# Patient Record
Sex: FEMALE | Race: WHITE | NOT HISPANIC OR LATINO | Employment: OTHER | URBAN - METROPOLITAN AREA
[De-identification: names, ages, dates, MRNs, and addresses within clinical notes are randomized per-mention and may not be internally consistent; named-entity substitution may affect disease eponyms.]

---

## 2024-04-02 ENCOUNTER — TELEPHONE (OUTPATIENT)
Dept: GASTROENTEROLOGY | Facility: CLINIC | Age: 54
End: 2024-04-02
Payer: MEDICARE

## 2024-04-02 NOTE — TELEPHONE ENCOUNTER
----- Message from Zulema Cassidy sent at 4/2/2024 10:38 AM CDT -----  Regarding: Referral  Good morning,       I received a referral on behalf of the patient attached, from MERVIN Kapoor requesting evaluation on a pt who has had a rich-en-y gastric bypass with cholelithiasis and evidence for CBD stone.  I have scanned the referral and notes into media mgr.  Please review and contact the patient to schedule.      Thank you,        Zulema Cassidy  [facility]

## 2024-04-05 ENCOUNTER — TELEPHONE (OUTPATIENT)
Dept: SURGERY | Facility: CLINIC | Age: 54
End: 2024-04-05
Payer: MEDICARE

## 2024-04-08 ENCOUNTER — TELEPHONE (OUTPATIENT)
Dept: SURGERY | Facility: CLINIC | Age: 54
End: 2024-04-08
Payer: MEDICARE

## 2024-04-09 ENCOUNTER — TELEPHONE (OUTPATIENT)
Dept: GASTROENTEROLOGY | Facility: CLINIC | Age: 54
End: 2024-04-09
Payer: MEDICARE

## 2024-04-09 NOTE — TELEPHONE ENCOUNTER
----- Message from Anibal Rose sent at 4/9/2024 10:29 AM CDT -----  Regarding: appt  change  PATIENT CALL    Pt called regarding upcoming appt. She was under the impression that this would be a virtual appt. Verified that she has access to the portal, Please call back at [phone] to advise further

## 2024-04-10 NOTE — PROGRESS NOTES
The patient location is: home  The chief complaint leading to consultation is: choledocholithiasis    Visit type: audiovisual    Face to Face time with patient: 36 minutes  45 minutes of total time spent on the encounter, which includes face to face time and non-face to face time preparing to see the patient (eg, review of tests), Obtaining and/or reviewing separately obtained history, Documenting clinical information in the electronic or other health record, Independently interpreting results (not separately reported) and communicating results to the patient/family/caregiver, or Care coordination (not separately reported).     Each patient to whom he or she provides medical services by telemedicine is:  (1) informed of the relationship between the physician and patient and the respective role of any other health care provider with respect to management of the patient; and (2) notified that he or she may decline to receive medical services by telemedicine and may withdraw from such care at any time.    Encounter Date:  2024    Patient ID: Anila Roberson  Age:  54 y.o. :  1970       Chief Complaint   Patient presents with    Consult    Cholelithiasis     History:    Ms. Roberson is a 54 y.o. female who presents with choledocholithiasis. She remains at home in [place], [place].     Referred by: Dr. KIANA Kapoor    Reviewed outside medical records and imaging.   She presented to local ED with epigastric / chest pain that radiated to her back in 2024. Imaging identified distended gallbladder with stones and stones in common bile duct and LFTs were elevated. Labs last month revealed LFTs WNL. She continues to experience intermittent epigastric pain and usually has post prandial discomfort. She has changed to a bland diet, oral diet recall includes eggs/ toast, yogurt, salad, ground turkey. Drinks mostly water. Reports she is maintaining weight, home scale weight is 174-176#. Denies N/V/D. Denies dysphagia.  Remains afebile since completing oral abx provided at [facility].   Followed by pain mgmt for chronic pain in back and neck as well as migraines and fibromyalgia.   Followed by cardiology at [facility]  Pending surgery for KEITH.     Prior abd surgery: Gastric bypass 2002  Not currently taking anticoagulation  Former smoked    Data:     Radiology:  Dr. Underwood and I personally reviewed these images:   3/19/2024: MRI abd:      2/2024: CT A/P:        3/6/2024 Labs:  [facility]              Endoscopy:        Past Medical History:   Diagnosis Date    Arthritis     Atrial fibrillation     GERD (gastroesophageal reflux disease)     Hyperlipidemia     Hypertension     Hypothyroidism     Migraine headache     Sleep apnea, unspecified      Past Surgical History:   Procedure Laterality Date    CARDIAC ELECTROPHYSIOLOGY STUDY AND ABLATION  2022    CERVICAL FUSION      cage C4-C7    GASTRIC BYPASS  2002    SPINE SURGERY      L5-S1     Current Outpatient Medications on File Prior to Visit   Medication Sig Dispense Refill    amLODIPine (NORVASC) 10 MG tablet Take 10 mg by mouth once daily.      atorvastatin (LIPITOR) 80 MG tablet Take 40 mg by mouth every evening.      butalbital-acetaminophen-caffeine -40 mg (FIORICET, ESGIC) -40 mg per tablet Take 1 tablet by mouth 2 (two) times daily as needed for Headaches.      estradioL (ESTRACE) 1 MG tablet Take 1 tablet by mouth every evening.      famotidine (PEPCID) 20 MG tablet Take 20 mg by mouth 2 (two) times daily.      FLUoxetine 20 MG capsule Take 80 mg by mouth once daily.      HYDROcodone-acetaminophen (NORCO)  mg per tablet Take 1 tablet by mouth every 6 (six) hours as needed for Pain.      levothyroxine (SYNTHROID) 50 MCG tablet Take 50 mcg by mouth before breakfast.      magnesium oxide 420 mg Tab Take 420 mg by mouth once daily.      MYRBETRIQ 50 mg Tb24 Take 1 tablet by mouth every morning.      propranoloL (INDERAL) 10 MG tablet Take 30 mg by  mouth 2 (two) times daily.      tiZANidine (ZANAFLEX) 2 MG tablet Take 2 mg by mouth every 8 (eight) hours as needed.      tolterodine (DETROL LA) 4 MG 24 hr capsule Take 4 mg by mouth every evening.      topiramate (TOPAMAX) 100 MG tablet Take 100 mg by mouth 2 (two) times daily.      zinc sulfate (ZINCATE) 50 mg zinc (220 mg) capsule Take 50 mg by mouth once daily.      BANOPHEN 25 mg capsule Take 25 mg by mouth nightly as needed. (Patient not taking: Reported on 4/11/2024)      promethazine (PHENERGAN) 25 MG tablet Take 1 tablet by mouth every 6 (six) hours as needed for Nausea. (Patient not taking: Reported on 4/11/2024)       No current facility-administered medications on file prior to visit.     Review of patient's allergies indicates:  No Known Allergies    Family History:  Her family history includes Alcohol abuse in her mother; Heart disease in her father and mother.     Social History:   reports that she has quit smoking. Her smoking use included cigarettes. She does not have any smokeless tobacco history on file. She reports current alcohol use of about 1.0 standard drink of alcohol per week. She reports current drug use. Drug: Marijuana.   States she has a license for medical marijuana    ROS:    Pertinent positive/negatives detailed in HPI, all other systems negative.     Physical Exam: virtual visit only  Wt 78.9 kg (174 lb)     Constitutional:  Non-toxic, no acute distress.    Eyes:  Sclerae anicteric, gaze symmetrical  Neck:  Trachea midline, FROM  Resp:  Even and unlabored resp  Neuro:  No gross facial deficits  Psych:  Awake, alert, oriented.  Answers and asks questions appropriately      ICD-10-CM ICD-9-CM    1. Choledocholithiasis  K80.50 574.50 CBC Auto Differential      HEPATIC FUNCTION PANEL      Prealbumin      Comprehensive Metabolic Panel      PROTIME-INR      2. H/O gastric bypass in 2002  Z98.84 V45.86       3. Abnormal results of liver function studies  R94.5 794.8 PROTIME-INR         Plan:  Discussed laparoscopic cholecystectomy with intraoperative ERCP if needed. Will coordinate with [facility].   Needs an in person visit with labs including LFTs prior surgery. She verbalized understanding and is agreeable to this plan.     Discussed case with Dr. Underwood who agrees with the above plan of care.         Madelyn Gutierrez NP  Upper GI / Hepatobiliary Surgical Oncology  [facility] [place], [place]  Office: [phone]  Fax: [phone]

## 2024-04-11 ENCOUNTER — OFFICE VISIT (OUTPATIENT)
Dept: SURGERY | Facility: CLINIC | Age: 54
End: 2024-04-11
Payer: MEDICARE

## 2024-04-11 ENCOUNTER — OFFICE VISIT (OUTPATIENT)
Dept: GASTROENTEROLOGY | Facility: CLINIC | Age: 54
End: 2024-04-11
Payer: MEDICARE

## 2024-04-11 VITALS — WEIGHT: 174 LBS

## 2024-04-11 DIAGNOSIS — R93.3 ABNORMAL MAGNETIC RESONANCE CHOLANGIOPANCREATOGRAPHY (MRCP): Primary | ICD-10-CM

## 2024-04-11 DIAGNOSIS — R94.5 ABNORMAL RESULTS OF LIVER FUNCTION STUDIES: ICD-10-CM

## 2024-04-11 DIAGNOSIS — K80.50 CHOLEDOCHOLITHIASIS: Primary | ICD-10-CM

## 2024-04-11 DIAGNOSIS — Z98.84 H/O GASTRIC BYPASS: ICD-10-CM

## 2024-04-11 DIAGNOSIS — R10.10 PAIN OF UPPER ABDOMEN: ICD-10-CM

## 2024-04-11 PROCEDURE — 99204 OFFICE O/P NEW MOD 45 MIN: CPT | Mod: 95,,, | Performed by: NURSE PRACTITIONER

## 2024-04-11 PROCEDURE — 99203 OFFICE O/P NEW LOW 30 MIN: CPT | Mod: 95,,, | Performed by: INTERNAL MEDICINE

## 2024-04-11 RX ORDER — MIRABEGRON 50 MG/1
1 TABLET, FILM COATED, EXTENDED RELEASE ORAL EVERY MORNING
COMMUNITY
Start: 2023-06-10 | End: 2024-09-25

## 2024-04-11 RX ORDER — TOLTERODINE 4 MG/1
4 CAPSULE, EXTENDED RELEASE ORAL NIGHTLY
COMMUNITY
Start: 2023-05-10

## 2024-04-11 RX ORDER — FLUOXETINE HYDROCHLORIDE 20 MG/1
80 CAPSULE ORAL DAILY
COMMUNITY
Start: 2023-07-10

## 2024-04-11 RX ORDER — DIPHENHYDRAMINE HYDROCHLORIDE 25 MG/1
25 CAPSULE ORAL NIGHTLY PRN
COMMUNITY

## 2024-04-11 RX ORDER — ESTRADIOL 1 MG/1
1 TABLET ORAL NIGHTLY
COMMUNITY
Start: 2023-07-10 | End: 2025-01-08

## 2024-04-11 RX ORDER — ATORVASTATIN CALCIUM 80 MG/1
40 TABLET, FILM COATED ORAL NIGHTLY
COMMUNITY
Start: 2024-01-08

## 2024-04-11 RX ORDER — TOPIRAMATE 100 MG/1
100 TABLET, FILM COATED ORAL 2 TIMES DAILY
COMMUNITY

## 2024-04-11 RX ORDER — PROMETHAZINE HYDROCHLORIDE 25 MG/1
1 TABLET ORAL EVERY 6 HOURS PRN
COMMUNITY
Start: 2023-08-04 | End: 2024-10-04

## 2024-04-11 RX ORDER — HYDROCODONE BITARTRATE AND ACETAMINOPHEN 10; 325 MG/1; MG/1
1 TABLET ORAL EVERY 6 HOURS PRN
COMMUNITY
Start: 2023-06-10 | End: 2024-04-22

## 2024-04-11 RX ORDER — LEVOTHYROXINE SODIUM 50 UG/1
50 TABLET ORAL
COMMUNITY
Start: 2024-01-08

## 2024-04-11 RX ORDER — PROPRANOLOL HYDROCHLORIDE 10 MG/1
30 TABLET ORAL 2 TIMES DAILY
COMMUNITY
Start: 2023-06-10 | End: 2025-01-08

## 2024-04-11 RX ORDER — BUTALBITAL, ACETAMINOPHEN AND CAFFEINE 50; 325; 40 MG/1; MG/1; MG/1
1 TABLET ORAL 2 TIMES DAILY PRN
COMMUNITY

## 2024-04-11 RX ORDER — TIZANIDINE 2 MG/1
2 TABLET ORAL EVERY 8 HOURS PRN
COMMUNITY
Start: 2023-08-24 | End: 2025-01-08

## 2024-04-11 RX ORDER — AMLODIPINE BESYLATE 10 MG/1
10 TABLET ORAL DAILY
COMMUNITY
Start: 2024-01-08 | End: 2025-01-08

## 2024-04-11 RX ORDER — FAMOTIDINE 20 MG/1
20 TABLET, FILM COATED ORAL 2 TIMES DAILY
Status: ON HOLD | COMMUNITY
Start: 2023-07-10 | End: 2024-04-26 | Stop reason: HOSPADM

## 2024-04-11 RX ORDER — MAGNESIUM OXIDE 420 MG/1
420 TABLET ORAL DAILY
COMMUNITY
Start: 2024-01-08 | End: 2025-01-08

## 2024-04-11 RX ORDER — ZINC SULFATE 50(220)MG
50 CAPSULE ORAL DAILY
COMMUNITY
Start: 2023-08-10

## 2024-04-11 NOTE — PROGRESS NOTES
The patient location is:  Home in [place]  The chief complaint leading to consultation is: abnl MRCP; possible bile duct obstruction    Visit type: audiovisual    Face to Face time with patient: 15  25 minutes of total time spent on the encounter, which includes face to face time and non-face to face time preparing to see the patient (eg, review of tests), Obtaining and/or reviewing separately obtained history, Documenting clinical information in the electronic or other health record, Independently interpreting results (not separately reported) and communicating results to the patient/family/caregiver, or Care coordination (not separately reported).         Each patient to whom he or she provides medical services by telemedicine is:  (1) informed of the relationship between the physician and patient and the respective role of any other health care provider with respect to management of the patient; and (2) notified that he or she may decline to receive medical services by telemedicine and may withdraw from such care at any time.    Notes:        Advanced Endoscopy / Pancreaticobiliary Service    Reason for visit (Chief Complaint):  Abnormal MRCP; epigastric pain    Referring provider/PCP: No referring provider defined for this encounter.    History of Present Illness: Patient presents for above.  She was referred here from [facility] by Dr. Nabil Kapoor.  She has had episodic epigastric and right upper quadrant pain since mid February.  The episode in mid February was quite severe and necessitated medical evaluation.  An MRI performed in [place] in March showed a dilated common bile duct with a possible filling defect in the distal common bile duct.  The gallbladder also had multiple stones.  She states her symptoms of abdominal pain do worsen with certain foods.     She has history of Que-en-Y gastric bypass performed about 20 years ago.      Physical Exam:  General: Well-developed,  well-appearing, no acute distress      Laboratory:   Reviewed labs from Feb.  There is mild elevation of her transaminases and alkaline phosphatase.    Imaging:  Reviewed reports of her MRCP.    Assessment/Plan:  Abnormal MRCP and epigastric pain- her symptoms are certainly consistent with biliary colic.  She will be seeing 1 of my surgical colleagues later today.  I suspect a gallbladder resection will be planned.  Given her history of Que-en-Y gastric bypass, it would probably be best to coordinate her gallbladder resection with a possible intraoperative ERCP.  Ideally, the surgical team would perform an intraoperative cholangiogram to confirm the presence or absence of a bile duct stone.  If a stone is seen, an intraoperative ERCP could be performed immediately.  I will await her evaluation by my surgical colleague, and we can then coordinate a date and time if cholecystectomy is planned.        Yariel Tovar MD, SUSIE   - Department of Gastroenterology  [facility] [place] [facility]

## 2024-04-12 ENCOUNTER — TELEPHONE (OUTPATIENT)
Dept: SURGERY | Facility: CLINIC | Age: 54
End: 2024-04-12
Payer: MEDICARE

## 2024-04-12 ENCOUNTER — PATIENT MESSAGE (OUTPATIENT)
Dept: SURGERY | Facility: CLINIC | Age: 54
End: 2024-04-12
Payer: MEDICARE

## 2024-04-12 NOTE — TELEPHONE ENCOUNTER
"Pt reports eating eggs this morning and experiencing a "gall bladder attack" that was longer and more painful than usual. Pt rated pain to be 9/10 using pain scale. Pt states she couldn't sit during attack and had to stand and bend over to relieve pain. Pt denies fever, body aches or chills at this time. Advised to go to nearest ED for assessment of pain worsens or if she experiences signs of infection as in fever, chills or body aches. Pt verbalized understanding and message sent to CHERRY Wise to notify.   "

## 2024-04-16 ENCOUNTER — PATIENT MESSAGE (OUTPATIENT)
Dept: HEMATOLOGY/ONCOLOGY | Facility: CLINIC | Age: 54
End: 2024-04-16
Payer: MEDICARE

## 2024-04-17 NOTE — PROGRESS NOTES
Encounter Date:  2024    Patient ID: Anila Roberson  Age:  54 y.o. :  1970    Chief Complaint:  abd pain     2024: presented to [place] ED with epigastric pain, imaging - distended gallbladder with stones in CBD.   3/19/2024: outside MRI    Interval History:  Ms. Roberson presents to clinic alone from [place], [place]. She was recently seen on VV to discuss possible lap cholecystectomy with intra-op ERCP if needed.   She is experiencing epigastric abd pain, radiates to lower back, worse since last week. She is tolerating bland diet. Home scale weight is stable at 176#. Reports mild nausea intermittently relieved with Zofran. Denies vomit and diarrhea. Taking Pepcid. Reports she had low grade fever earlier this week, resolved with Tylenol. Completed oral abx that were prescribed per [facility]. Labs last month with normal LFTs. Labs today - LFT elevated.   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   Followed by pain mgmt for chronic pain in back and neck as well as migraines and fibromyalgia.   Followed by cardiology at [facility]  Pending surgery for KEITH.      Prior abd surgery: Gastric bypass   Not currently taking anticoagulation  Former smoked    New Data:  Labs:    Lab Results   Component Value Date    WBC 10.51 2024    HGB 13.6 2024    HCT 42.1 2024    MCV 99 (H) 2024     2024     Sodium   Date Value Ref Range Status   2024 134 (L) 136 - 145 mmol/L Final     Potassium   Date Value Ref Range Status   2024 3.7 3.5 - 5.1 mmol/L Final     Chloride   Date Value Ref Range Status   2024 106 95 - 110 mmol/L Final     CO2   Date Value Ref Range Status   2024 20 (L) 23 - 29 mmol/L Final     Glucose   Date Value Ref Range Status   2024 113 (H) 70 - 110 mg/dL Final     BUN   Date Value Ref Range Status   2024 10 6 - 20 mg/dL Final     Creatinine   Date Value Ref Range  Status   04/18/2024 0.7 0.5 - 1.4 mg/dL Final     Calcium   Date Value Ref Range Status   04/18/2024 9.5 8.7 - 10.5 mg/dL Final     Total Protein   Date Value Ref Range Status   04/18/2024 7.4 6.0 - 8.4 g/dL Final   04/18/2024 7.4 6.0 - 8.4 g/dL Final     Albumin   Date Value Ref Range Status   04/18/2024 3.4 (L) 3.5 - 5.2 g/dL Final   04/18/2024 3.4 (L) 3.5 - 5.2 g/dL Final     Total Bilirubin   Date Value Ref Range Status   04/18/2024 0.7 0.1 - 1.0 mg/dL Final     Comment:     For infants and newborns, interpretation of results should be based  on gestational age, weight and in agreement with clinical  observations.    Premature Infant recommended reference ranges:  Up to 24 hours.............<8.0 mg/dL  Up to 48 hours............<12.0 mg/dL  3-5 days..................<15.0 mg/dL  6-29 days.................<15.0 mg/dL     04/18/2024 0.7 0.1 - 1.0 mg/dL Final     Comment:     For infants and newborns, interpretation of results should be based  on gestational age, weight and in agreement with clinical  observations.    Premature Infant recommended reference ranges:  Up to 24 hours.............<8.0 mg/dL  Up to 48 hours............<12.0 mg/dL  3-5 days..................<15.0 mg/dL  6-29 days.................<15.0 mg/dL       Alkaline Phosphatase   Date Value Ref Range Status   04/18/2024 282 (H) 55 - 135 U/L Final   04/18/2024 282 (H) 55 - 135 U/L Final     AST   Date Value Ref Range Status   04/18/2024 82 (H) 10 - 40 U/L Final   04/18/2024 82 (H) 10 - 40 U/L Final     ALT   Date Value Ref Range Status   04/18/2024 138 (H) 10 - 44 U/L Final   04/18/2024 138 (H) 10 - 44 U/L Final     Anion Gap   Date Value Ref Range Status   04/18/2024 8 8 - 16 mmol/L Final     eGFR   Date Value Ref Range Status   04/18/2024 >60.0 >60 mL/min/1.73 m^2 Final       Past Medical History:   Diagnosis Date    Arthritis     Atrial fibrillation     GERD (gastroesophageal reflux disease)     Hyperlipidemia     Hypertension     Hypothyroidism      Migraine headache     Sleep apnea, unspecified      Past Surgical History:   Procedure Laterality Date    CARDIAC ELECTROPHYSIOLOGY STUDY AND ABLATION  2022    CERVICAL FUSION      cage C4-C7    GASTRIC BYPASS  2002    SPINE SURGERY      L5-S1     Current Outpatient Medications on File Prior to Visit   Medication Sig Dispense Refill    amLODIPine (NORVASC) 10 MG tablet Take 10 mg by mouth once daily.      atorvastatin (LIPITOR) 80 MG tablet Take 40 mg by mouth every evening.      BANOPHEN 25 mg capsule Take 25 mg by mouth nightly as needed. (Patient not taking: Reported on 4/11/2024)      butalbital-acetaminophen-caffeine -40 mg (FIORICET, ESGIC) -40 mg per tablet Take 1 tablet by mouth 2 (two) times daily as needed for Headaches.      estradioL (ESTRACE) 1 MG tablet Take 1 tablet by mouth every evening.      famotidine (PEPCID) 20 MG tablet Take 20 mg by mouth 2 (two) times daily.      FLUoxetine 20 MG capsule Take 80 mg by mouth once daily.      HYDROcodone-acetaminophen (NORCO)  mg per tablet Take 1 tablet by mouth every 6 (six) hours as needed for Pain.      levothyroxine (SYNTHROID) 50 MCG tablet Take 50 mcg by mouth before breakfast.      magnesium oxide 420 mg Tab Take 420 mg by mouth once daily.      MYRBETRIQ 50 mg Tb24 Take 1 tablet by mouth every morning.      promethazine (PHENERGAN) 25 MG tablet Take 1 tablet by mouth every 6 (six) hours as needed for Nausea. (Patient not taking: Reported on 4/11/2024)      propranoloL (INDERAL) 10 MG tablet Take 30 mg by mouth 2 (two) times daily.      tiZANidine (ZANAFLEX) 2 MG tablet Take 2 mg by mouth every 8 (eight) hours as needed.      tolterodine (DETROL LA) 4 MG 24 hr capsule Take 4 mg by mouth every evening.      topiramate (TOPAMAX) 100 MG tablet Take 100 mg by mouth 2 (two) times daily.      zinc sulfate (ZINCATE) 50 mg zinc (220 mg) capsule Take 50 mg by mouth once daily.       No current facility-administered medications on file prior  to visit.     Review of patient's allergies indicates:  No Known Allergies    Family History:  Her family history includes Alcohol abuse in her mother; Heart disease in her father and mother.     Social History:   reports that she has quit smoking. Her smoking use included cigarettes. She does not have any smokeless tobacco history on file. She reports current alcohol use of about 1.0 standard drink of alcohol per week. She reports current drug use. Drug: Marijuana.     ROS:    Pertinent positive/negatives detailed in HPI, all other systems negative.     Physical Exam:  /71 (BP Location: Right arm, Patient Position: Sitting)   Pulse 80   Resp 18   Wt 80.2 kg (176 lb 11.2 oz)   SpO2 98%     Constitutional:  Non-toxic, no acute distress.    Eyes:  Sclerae anicteric, gaze symmetrical  Neck:  Trachea midline,  FROM  Resp:  Easy work of breathing  Abd:  Soft, non-tender, no masses, no ascites  Musculoskeletal:  Ambulatory, normal gait, no muscle wasting.  Extremities are symmetrical without lymphedema.  Neuro:  No gross deficits  Psych:  Awake, alert, oriented.  Answers and asks questions appropriately      ICD-10-CM ICD-9-CM    1. Choledocholithiasis  K80.50 574.50 CT Abdomen Pelvis With IV Contrast NO Oral Contrast (IV CONTRAST ONLY)      2. H/O gastric bypass in 2002  Z98.84 V45.86       Plan     Obtain CT A/P today if possible.   If abnormal, consider admit to hospital.   If CT acceptable, proceed with lap cholecystectomy, considering 5/3/24 for surgery and coordinating with [facility] for possible intra-op ERCP if needed.     Questions were asked and answered to patient's satisfaction.    Pt seen in conjunction with Dr. Underwood today.         Madelyn Gutierrez NP  Upper GI / Hepatobiliary Surgical Oncology  [facility] [place], [place]  Office: [phone]  Fax: [phone]          ADDENDUM: CT A/P  Impression:     Distended gallbladder with multiple stones.  No pericholecystic fluid.     Dilated common  duct (10 mm).  No obstructing lesion identified.  Further evaluation with MRCP/ERCP, as warranted clinically.     Prior gastric bypass and hysterectomy.

## 2024-04-18 ENCOUNTER — HOSPITAL ENCOUNTER (OUTPATIENT)
Dept: RADIOLOGY | Facility: HOSPITAL | Age: 54
Discharge: HOME | DRG: 418 | End: 2024-04-18
Attending: SURGERY
Payer: MEDICARE

## 2024-04-18 ENCOUNTER — OFFICE VISIT (OUTPATIENT)
Dept: SURGERY | Facility: CLINIC | Age: 54
DRG: 418 | End: 2024-04-18
Payer: MEDICARE

## 2024-04-18 VITALS
RESPIRATION RATE: 18 BRPM | DIASTOLIC BLOOD PRESSURE: 71 MMHG | SYSTOLIC BLOOD PRESSURE: 112 MMHG | WEIGHT: 176.69 LBS | OXYGEN SATURATION: 98 % | HEART RATE: 80 BPM

## 2024-04-18 DIAGNOSIS — K80.50 CHOLEDOCHOLITHIASIS: Primary | ICD-10-CM

## 2024-04-18 DIAGNOSIS — Z98.84 H/O GASTRIC BYPASS: ICD-10-CM

## 2024-04-18 DIAGNOSIS — K80.50 CHOLEDOCHOLITHIASIS: ICD-10-CM

## 2024-04-18 PROCEDURE — 99215 OFFICE O/P EST HI 40 MIN: CPT | Mod: PBBFAC,25 | Performed by: NURSE PRACTITIONER

## 2024-04-18 PROCEDURE — 25500020 PHARM REV CODE 255: Performed by: SURGERY

## 2024-04-18 PROCEDURE — 99214 OFFICE O/P EST MOD 30 MIN: CPT | Mod: S$PBB,,, | Performed by: NURSE PRACTITIONER

## 2024-04-18 PROCEDURE — 74177 CT ABD & PELVIS W/CONTRAST: CPT | Mod: 26,,, | Performed by: RADIOLOGY

## 2024-04-18 PROCEDURE — 99999 PR PBB SHADOW E&M-EST. PATIENT-LVL V: CPT | Mod: PBBFAC,,, | Performed by: NURSE PRACTITIONER

## 2024-04-18 PROCEDURE — 74177 CT ABD & PELVIS W/CONTRAST: CPT | Mod: TC

## 2024-04-18 RX ADMIN — IOHEXOL 75 ML: 350 INJECTION, SOLUTION INTRAVENOUS at 02:04

## 2024-04-19 ENCOUNTER — HOSPITAL ENCOUNTER (INPATIENT)
Facility: HOSPITAL | Age: 54
LOS: 7 days | Discharge: HOME | DRG: 418 | End: 2024-04-26
Attending: STUDENT IN AN ORGANIZED HEALTH CARE EDUCATION/TRAINING PROGRAM | Admitting: SURGERY
Payer: MEDICARE

## 2024-04-19 ENCOUNTER — PATIENT MESSAGE (OUTPATIENT)
Dept: SURGERY | Facility: CLINIC | Age: 54
End: 2024-04-19
Payer: MEDICARE

## 2024-04-19 DIAGNOSIS — K80.50 CHOLEDOCHOLITHIASIS: Primary | ICD-10-CM

## 2024-04-19 DIAGNOSIS — K81.9 CHOLECYSTITIS: ICD-10-CM

## 2024-04-19 LAB
ABO + RH BLD: NORMAL
ALBUMIN SERPL BCP-MCNC: 3.3 G/DL (ref 3.5–5.2)
ALLENS TEST: NORMAL
ALP SERPL-CCNC: 244 U/L (ref 55–135)
ALT SERPL W/O P-5'-P-CCNC: 106 U/L (ref 10–44)
ANION GAP SERPL CALC-SCNC: 10 MMOL/L (ref 8–16)
AST SERPL-CCNC: 55 U/L (ref 10–40)
BASOPHILS # BLD AUTO: 0.02 K/UL (ref 0–0.2)
BASOPHILS NFR BLD: 0.3 % (ref 0–1.9)
BILIRUB SERPL-MCNC: 0.4 MG/DL (ref 0.1–1)
BLD GP AB SCN CELLS X3 SERPL QL: NORMAL
BUN SERPL-MCNC: 12 MG/DL (ref 6–20)
CALCIUM SERPL-MCNC: 9.6 MG/DL (ref 8.7–10.5)
CHLORIDE SERPL-SCNC: 107 MMOL/L (ref 95–110)
CO2 SERPL-SCNC: 19 MMOL/L (ref 23–29)
CREAT SERPL-MCNC: 0.8 MG/DL (ref 0.5–1.4)
DIFFERENTIAL METHOD BLD: ABNORMAL
EOSINOPHIL # BLD AUTO: 0 K/UL (ref 0–0.5)
EOSINOPHIL NFR BLD: 0.1 % (ref 0–8)
ERYTHROCYTE [DISTWIDTH] IN BLOOD BY AUTOMATED COUNT: 13 % (ref 11.5–14.5)
EST. GFR  (NO RACE VARIABLE): >60 ML/MIN/1.73 M^2
GLUCOSE SERPL-MCNC: 89 MG/DL (ref 70–110)
HCT VFR BLD AUTO: 39.9 % (ref 37–48.5)
HCV AB SERPL QL IA: NORMAL
HGB BLD-MCNC: 12.9 G/DL (ref 12–16)
HIV 1+2 AB+HIV1 P24 AG SERPL QL IA: NORMAL
IMM GRANULOCYTES # BLD AUTO: 0.02 K/UL (ref 0–0.04)
IMM GRANULOCYTES NFR BLD AUTO: 0.3 % (ref 0–0.5)
LACTATE SERPL-SCNC: 0.8 MMOL/L (ref 0.5–2.2)
LDH SERPL L TO P-CCNC: 0.86 MMOL/L (ref 0.5–2.2)
LIPASE SERPL-CCNC: 18 U/L (ref 4–60)
LYMPHOCYTES # BLD AUTO: 0.9 K/UL (ref 1–4.8)
LYMPHOCYTES NFR BLD: 11.8 % (ref 18–48)
MCH RBC QN AUTO: 30.9 PG (ref 27–31)
MCHC RBC AUTO-ENTMCNC: 32.3 G/DL (ref 32–36)
MCV RBC AUTO: 96 FL (ref 82–98)
MONOCYTES # BLD AUTO: 0.6 K/UL (ref 0.3–1)
MONOCYTES NFR BLD: 8.6 % (ref 4–15)
NEUTROPHILS # BLD AUTO: 5.8 K/UL (ref 1.8–7.7)
NEUTROPHILS NFR BLD: 78.9 % (ref 38–73)
NRBC BLD-RTO: 0 /100 WBC
OHS QRS DURATION: 88 MS
OHS QTC CALCULATION: 459 MS
PLATELET # BLD AUTO: 198 K/UL (ref 150–450)
PMV BLD AUTO: 10.5 FL (ref 9.2–12.9)
POTASSIUM SERPL-SCNC: 3.4 MMOL/L (ref 3.5–5.1)
PROT SERPL-MCNC: 7.6 G/DL (ref 6–8.4)
RBC # BLD AUTO: 4.17 M/UL (ref 4–5.4)
SAMPLE: NORMAL
SITE: NORMAL
SODIUM SERPL-SCNC: 136 MMOL/L (ref 136–145)
SPECIMEN OUTDATE: NORMAL
WBC # BLD AUTO: 7.4 K/UL (ref 3.9–12.7)

## 2024-04-19 PROCEDURE — 20600001 HC STEP DOWN PRIVATE ROOM

## 2024-04-19 PROCEDURE — 63600175 PHARM REV CODE 636 W HCPCS: Performed by: STUDENT IN AN ORGANIZED HEALTH CARE EDUCATION/TRAINING PROGRAM

## 2024-04-19 PROCEDURE — 86850 RBC ANTIBODY SCREEN: CPT | Performed by: STUDENT IN AN ORGANIZED HEALTH CARE EDUCATION/TRAINING PROGRAM

## 2024-04-19 PROCEDURE — 80053 COMPREHEN METABOLIC PANEL: CPT | Performed by: STUDENT IN AN ORGANIZED HEALTH CARE EDUCATION/TRAINING PROGRAM

## 2024-04-19 PROCEDURE — 99900035 HC TECH TIME PER 15 MIN (STAT)

## 2024-04-19 PROCEDURE — 83605 ASSAY OF LACTIC ACID: CPT | Performed by: STUDENT IN AN ORGANIZED HEALTH CARE EDUCATION/TRAINING PROGRAM

## 2024-04-19 PROCEDURE — 83690 ASSAY OF LIPASE: CPT | Performed by: STUDENT IN AN ORGANIZED HEALTH CARE EDUCATION/TRAINING PROGRAM

## 2024-04-19 PROCEDURE — 93005 ELECTROCARDIOGRAM TRACING: CPT

## 2024-04-19 PROCEDURE — 99285 EMERGENCY DEPT VISIT HI MDM: CPT | Mod: 25

## 2024-04-19 PROCEDURE — 93010 ELECTROCARDIOGRAM REPORT: CPT | Mod: ,,, | Performed by: INTERNAL MEDICINE

## 2024-04-19 PROCEDURE — 87389 HIV-1 AG W/HIV-1&-2 AB AG IA: CPT | Performed by: PHYSICIAN ASSISTANT

## 2024-04-19 PROCEDURE — 99223 1ST HOSP IP/OBS HIGH 75: CPT | Mod: AI,GC,, | Performed by: SURGERY

## 2024-04-19 PROCEDURE — 96375 TX/PRO/DX INJ NEW DRUG ADDON: CPT

## 2024-04-19 PROCEDURE — 96365 THER/PROPH/DIAG IV INF INIT: CPT

## 2024-04-19 PROCEDURE — 25000003 PHARM REV CODE 250: Performed by: STUDENT IN AN ORGANIZED HEALTH CARE EDUCATION/TRAINING PROGRAM

## 2024-04-19 PROCEDURE — 86803 HEPATITIS C AB TEST: CPT | Performed by: PHYSICIAN ASSISTANT

## 2024-04-19 PROCEDURE — 83605 ASSAY OF LACTIC ACID: CPT

## 2024-04-19 PROCEDURE — 85025 COMPLETE CBC W/AUTO DIFF WBC: CPT | Performed by: STUDENT IN AN ORGANIZED HEALTH CARE EDUCATION/TRAINING PROGRAM

## 2024-04-19 RX ORDER — ATORVASTATIN CALCIUM 40 MG/1
40 TABLET, FILM COATED ORAL NIGHTLY
Status: DISCONTINUED | OUTPATIENT
Start: 2024-04-19 | End: 2024-04-26 | Stop reason: HOSPADM

## 2024-04-19 RX ORDER — ACETAMINOPHEN 325 MG/1
650 TABLET ORAL EVERY 8 HOURS PRN
Status: DISCONTINUED | OUTPATIENT
Start: 2024-04-19 | End: 2024-04-26 | Stop reason: HOSPADM

## 2024-04-19 RX ORDER — PROCHLORPERAZINE EDISYLATE 5 MG/ML
5 INJECTION INTRAMUSCULAR; INTRAVENOUS EVERY 6 HOURS PRN
Status: DISCONTINUED | OUTPATIENT
Start: 2024-04-19 | End: 2024-04-20

## 2024-04-19 RX ORDER — AMLODIPINE BESYLATE 10 MG/1
10 TABLET ORAL DAILY
Status: DISCONTINUED | OUTPATIENT
Start: 2024-04-20 | End: 2024-04-26 | Stop reason: HOSPADM

## 2024-04-19 RX ORDER — ENOXAPARIN SODIUM 100 MG/ML
40 INJECTION SUBCUTANEOUS EVERY 24 HOURS
Status: DISCONTINUED | OUTPATIENT
Start: 2024-04-19 | End: 2024-04-26 | Stop reason: HOSPADM

## 2024-04-19 RX ORDER — FLUOXETINE HYDROCHLORIDE 20 MG/1
80 CAPSULE ORAL DAILY
Status: DISCONTINUED | OUTPATIENT
Start: 2024-04-20 | End: 2024-04-26 | Stop reason: HOSPADM

## 2024-04-19 RX ORDER — FAMOTIDINE 20 MG/1
20 TABLET, FILM COATED ORAL 2 TIMES DAILY
Status: DISCONTINUED | OUTPATIENT
Start: 2024-04-19 | End: 2024-04-25

## 2024-04-19 RX ORDER — OXYCODONE HYDROCHLORIDE 5 MG/1
5 TABLET ORAL EVERY 4 HOURS PRN
Status: DISCONTINUED | OUTPATIENT
Start: 2024-04-19 | End: 2024-04-24

## 2024-04-19 RX ORDER — ONDANSETRON HYDROCHLORIDE 2 MG/ML
4 INJECTION, SOLUTION INTRAVENOUS
Status: COMPLETED | OUTPATIENT
Start: 2024-04-19 | End: 2024-04-19

## 2024-04-19 RX ORDER — SODIUM CHLORIDE, SODIUM LACTATE, POTASSIUM CHLORIDE, CALCIUM CHLORIDE 600; 310; 30; 20 MG/100ML; MG/100ML; MG/100ML; MG/100ML
INJECTION, SOLUTION INTRAVENOUS CONTINUOUS
Status: DISCONTINUED | OUTPATIENT
Start: 2024-04-19 | End: 2024-04-20

## 2024-04-19 RX ORDER — LEVOTHYROXINE SODIUM 50 UG/1
50 TABLET ORAL
Status: DISCONTINUED | OUTPATIENT
Start: 2024-04-20 | End: 2024-04-26 | Stop reason: HOSPADM

## 2024-04-19 RX ORDER — MORPHINE SULFATE 4 MG/ML
4 INJECTION, SOLUTION INTRAMUSCULAR; INTRAVENOUS
Status: COMPLETED | OUTPATIENT
Start: 2024-04-19 | End: 2024-04-19

## 2024-04-19 RX ORDER — ONDANSETRON 8 MG/1
8 TABLET, ORALLY DISINTEGRATING ORAL EVERY 8 HOURS PRN
Status: DISCONTINUED | OUTPATIENT
Start: 2024-04-19 | End: 2024-04-20

## 2024-04-19 RX ADMIN — MORPHINE SULFATE 4 MG: 4 INJECTION INTRAVENOUS at 05:04

## 2024-04-19 RX ADMIN — SODIUM CHLORIDE, POTASSIUM CHLORIDE, SODIUM LACTATE AND CALCIUM CHLORIDE: 600; 310; 30; 20 INJECTION, SOLUTION INTRAVENOUS at 06:04

## 2024-04-19 RX ADMIN — PROPRANOLOL HYDROCHLORIDE 30 MG: 20 TABLET ORAL at 09:04

## 2024-04-19 RX ADMIN — ONDANSETRON 4 MG: 2 INJECTION INTRAMUSCULAR; INTRAVENOUS at 05:04

## 2024-04-19 RX ADMIN — PROCHLORPERAZINE EDISYLATE 5 MG: 5 INJECTION INTRAMUSCULAR; INTRAVENOUS at 06:04

## 2024-04-19 RX ADMIN — ATORVASTATIN CALCIUM 40 MG: 40 TABLET, FILM COATED ORAL at 09:04

## 2024-04-19 RX ADMIN — FAMOTIDINE 20 MG: 20 TABLET ORAL at 09:04

## 2024-04-19 RX ADMIN — OXYCODONE 5 MG: 5 TABLET ORAL at 06:04

## 2024-04-19 RX ADMIN — ENOXAPARIN SODIUM 40 MG: 40 INJECTION SUBCUTANEOUS at 06:04

## 2024-04-19 RX ADMIN — PIPERACILLIN SODIUM AND TAZOBACTAM SODIUM 4.5 G: 4; .5 INJECTION, POWDER, FOR SOLUTION INTRAVENOUS at 04:04

## 2024-04-19 RX ADMIN — SODIUM CHLORIDE, POTASSIUM CHLORIDE, SODIUM LACTATE AND CALCIUM CHLORIDE 1000 ML: 600; 310; 30; 20 INJECTION, SOLUTION INTRAVENOUS at 05:04

## 2024-04-19 NOTE — SUBJECTIVE & OBJECTIVE
Current Facility-Administered Medications   Medication Dose Route Frequency Provider Last Rate Last Admin    lactated ringers bolus 1,000 mL  1,000 mL Intravenous ED 1 Time Alysa Aceves MD        morphine injection 4 mg  4 mg Intravenous ED 1 Time Alysa Aceves MD        ondansetron injection 4 mg  4 mg Intravenous ED 1 Time Alysa Aceves MD         Current Outpatient Medications   Medication Sig Dispense Refill    amLODIPine (NORVASC) 10 MG tablet Take 10 mg by mouth once daily.      atorvastatin (LIPITOR) 80 MG tablet Take 40 mg by mouth every evening.      BANOPHEN 25 mg capsule Take 25 mg by mouth nightly as needed.      butalbital-acetaminophen-caffeine -40 mg (FIORICET, ESGIC) -40 mg per tablet Take 1 tablet by mouth 2 (two) times daily as needed for Headaches.      estradioL (ESTRACE) 1 MG tablet Take 1 tablet by mouth every evening.      famotidine (PEPCID) 20 MG tablet Take 20 mg by mouth 2 (two) times daily.      FLUoxetine 20 MG capsule Take 80 mg by mouth once daily.      HYDROcodone-acetaminophen (NORCO)  mg per tablet Take 1 tablet by mouth every 6 (six) hours as needed for Pain.      levothyroxine (SYNTHROID) 50 MCG tablet Take 50 mcg by mouth before breakfast.      magnesium oxide 420 mg Tab Take 420 mg by mouth once daily.      MYRBETRIQ 50 mg Tb24 Take 1 tablet by mouth every morning.      promethazine (PHENERGAN) 25 MG tablet Take 1 tablet by mouth every 6 (six) hours as needed for Nausea.      propranoloL (INDERAL) 10 MG tablet Take 30 mg by mouth 2 (two) times daily.      tiZANidine (ZANAFLEX) 2 MG tablet Take 2 mg by mouth every 8 (eight) hours as needed.      tolterodine (DETROL LA) 4 MG 24 hr capsule Take 4 mg by mouth every evening.      topiramate (TOPAMAX) 100 MG tablet Take 100 mg by mouth 2 (two) times daily.      zinc sulfate (ZINCATE) 50 mg zinc (220 mg) capsule Take 50 mg by mouth once daily.         Review of patient's allergies indicates:  No  Known Allergies    Past Medical History:   Diagnosis Date    Arthritis     Atrial fibrillation     GERD (gastroesophageal reflux disease)     Hyperlipidemia     Hypertension     Hypothyroidism     Migraine headache     Sleep apnea, unspecified      Past Surgical History:   Procedure Laterality Date    CARDIAC ELECTROPHYSIOLOGY STUDY AND ABLATION  2022    CERVICAL FUSION      cage C4-C7    GASTRIC BYPASS  2002    SPINE SURGERY      L5-S1     Family History       Problem Relation (Age of Onset)    Alcohol abuse Mother    Heart disease Mother, Father          Tobacco Use    Smoking status: Former     Types: Cigarettes    Smokeless tobacco: Never   Substance and Sexual Activity    Alcohol use: Yes     Alcohol/week: 1.0 standard drink of alcohol     Types: 1 Glasses of wine per week    Drug use: Yes     Types: Marijuana     Comment: medical marijuana    Sexual activity: Not on file     Review of Systems   Constitutional:  Positive for activity change, appetite change and fatigue. Negative for fever and unexpected weight change.   HENT: Negative.     Respiratory:  Negative for cough and shortness of breath.    Cardiovascular:  Negative for chest pain.   Gastrointestinal:  Positive for abdominal distention, abdominal pain and nausea. Negative for constipation, diarrhea and vomiting.   Endocrine: Negative.    Musculoskeletal: Negative.    Skin: Negative.  Negative for color change.     Objective:     Vital Signs (Most Recent):  Temp: 98.1 °F (36.7 °C) (04/19/24 1219)  Pulse: 84 (04/19/24 1435)  Resp: 18 (04/19/24 1435)  BP: 139/79 (04/19/24 1435)  SpO2: 99 % (04/19/24 1435) Vital Signs (24h Range):  Temp:  [98.1 °F (36.7 °C)] 98.1 °F (36.7 °C)  Pulse:  [82-84] 84  Resp:  [18-20] 18  SpO2:  [98 %-99 %] 99 %  BP: (129-139)/(61-79) 139/79     Weight: 78.9 kg (174 lb)  Body mass index is 31.83 kg/m².     Physical Exam  Vitals and nursing note reviewed.   Constitutional:       General: She is not in acute distress.      Appearance: Normal appearance.   HENT:      Nose: Nose normal.      Mouth/Throat:      Mouth: Mucous membranes are moist.   Cardiovascular:      Rate and Rhythm: Normal rate and regular rhythm.   Pulmonary:      Effort: Pulmonary effort is normal. No respiratory distress.   Abdominal:      Comments: Abdomen soft, non-distended  Tender to palpation at the RUQ  No evidence of peritonitis  Previous surgical incisions well healed   Musculoskeletal:         General: Normal range of motion.   Skin:     General: Skin is warm.      Coloration: Skin is not jaundiced.   Neurological:      General: No focal deficit present.      Mental Status: She is alert.            I have reviewed all pertinent lab results within the past 24 hours.  CBC:   Recent Labs   Lab 04/19/24  1601   WBC 7.40   RBC 4.17   HGB 12.9   HCT 39.9      MCV 96   MCH 30.9   MCHC 32.3     CMP:   Recent Labs   Lab 04/19/24  1601   GLU 89   CALCIUM 9.6   ALBUMIN 3.3*   PROT 7.6      K 3.4*   CO2 19*      BUN 12   CREATININE 0.8   ALKPHOS 244*   *   AST 55*   BILITOT 0.4       Significant Diagnostics:  I have reviewed all pertinent imaging results/findings within the past 24 hours.

## 2024-04-19 NOTE — ED TRIAGE NOTES
Pt arrives to ED via POV c/o nausea and fever. Pt diagnosed with gallstones in February with ERCP scheduled in May. 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 Symptoms worsening since yesterday, loss of appetite. Pt advised to come in per MD. No bouts of emesis. Endorses epigastric pain that radiates to back.

## 2024-04-19 NOTE — HPI
Anila Roberson is a 54yoF who is known to the surgical oncology service. She has a history significant for HTN, HLD, and Que-en-Y gastric bypass who was scheduled for outpatient cholecystectomy with joint intra-operative ERCP with Dr. Tovar. She was seen in our clinic yesterday. The patient began to experience, low grade fevers, worsening right upper quadrant abdominal pain, nausea, PO aversion. Given the worsening pain and fevers, she was instructed to present to the emergency department.     After the clinic visit yesterday, her CMP was significant for elevated ALP, AST, and ALT. Her CT scan demonstrates continued common bile duct dilation to 1cm. In the emergency department today, she continues to endorse epigastric abdominal pain and remains tender. She is not jaundiced and does not demonstrate signs of peritonitis.     She has had a previous bypass and hysterectomy. She takes no blood thinning medications.

## 2024-04-19 NOTE — ASSESSMENT & PLAN NOTE
Anila Roberson is a 54yoF who is known to the surgical oncology service. She has a history of Que-en-Y gastric bypass and presents with an ascending cholangitis picture.     - admit to surgical oncology   - labs and imaging reviewed   - her LFTs are downtrending, bilirubin remains normal   - CT scan demonstrates persistent biliary ductal dilation  - IV antibiotics  - MRCP for further evaluation  - NPO, mIVFs  - will consult [facility] service to make them aware if MRCP is positive, will plan for intra-operative ERCP this weekend.   - pain and anti-emetics PRN

## 2024-04-19 NOTE — ED PROVIDER NOTES
Chief Complaint   Nausea (States scheduled to have Gallbladder taken out 5/3)      History Of Present Illness   Anila Roberson is a 54 y.o. female with a PMHx including HTN, hypothyroidism, gastic bypass, afib s/p ablation  presenting with concerns of increasing epigastric pain with radiation to the back, nausea and fever since last night. She has known cholecystitis/ choledocholithiasis and was seen by general surgery yesterday, scheduled for cholecystectomy +/- ERCP on 5/3/24, and was recommended to present to the ER if symptoms worsen. She reports her highest fever last night was 101. She had a bowel movement this morning which was normal, but has not been able to eat. She has been tolerating fluids. She does endorse burning with urination, but denies palpitations, cough, SOB, hematochezia, melena.     Independent Historian: Yes  Other Historian or Collateral: Chart review  Interpretor: No      Review of patient's allergies indicates:  No Known Allergies    No current facility-administered medications on file prior to encounter.     Current Outpatient Medications on File Prior to Encounter   Medication Sig Dispense Refill    amLODIPine (NORVASC) 10 MG tablet Take 10 mg by mouth once daily.      atorvastatin (LIPITOR) 80 MG tablet Take 40 mg by mouth every evening.      BANOPHEN 25 mg capsule Take 25 mg by mouth nightly as needed.      butalbital-acetaminophen-caffeine -40 mg (FIORICET, ESGIC) -40 mg per tablet Take 1 tablet by mouth 2 (two) times daily as needed for Headaches.      estradioL (ESTRACE) 1 MG tablet Take 1 tablet by mouth every evening.      famotidine (PEPCID) 20 MG tablet Take 20 mg by mouth 2 (two) times daily.      FLUoxetine 20 MG capsule Take 80 mg by mouth once daily.      HYDROcodone-acetaminophen (NORCO)  mg per tablet Take 1 tablet by mouth every 6 (six) hours as needed for Pain.      levothyroxine (SYNTHROID) 50 MCG tablet Take 50 mcg by mouth before breakfast.       magnesium oxide 420 mg Tab Take 420 mg by mouth once daily.      MYRBETRIQ 50 mg Tb24 Take 1 tablet by mouth every morning.      promethazine (PHENERGAN) 25 MG tablet Take 1 tablet by mouth every 6 (six) hours as needed for Nausea.      propranoloL (INDERAL) 10 MG tablet Take 30 mg by mouth 2 (two) times daily.      tiZANidine (ZANAFLEX) 2 MG tablet Take 2 mg by mouth every 8 (eight) hours as needed.      tolterodine (DETROL LA) 4 MG 24 hr capsule Take 4 mg by mouth every evening.      topiramate (TOPAMAX) 100 MG tablet Take 100 mg by mouth 2 (two) times daily.      zinc sulfate (ZINCATE) 50 mg zinc (220 mg) capsule Take 50 mg by mouth once daily.         Past History   As per HPI and below:  Past Medical History:   Diagnosis Date    Arthritis     Atrial fibrillation     GERD (gastroesophageal reflux disease)     Hyperlipidemia     Hypertension     Hypothyroidism     Migraine headache     Sleep apnea, unspecified      Past Surgical History:   Procedure Laterality Date    CARDIAC ELECTROPHYSIOLOGY STUDY AND ABLATION  2022    CERVICAL FUSION      cage C4-C7    GASTRIC BYPASS  2002    SPINE SURGERY      L5-S1       Social History     Socioeconomic History    Marital status:    Tobacco Use    Smoking status: Former     Types: Cigarettes    Smokeless tobacco: Never   Substance and Sexual Activity    Alcohol use: Yes     Alcohol/week: 1.0 standard drink of alcohol     Types: 1 Glasses of wine per week    Drug use: Yes     Types: Marijuana     Comment: medical marijuana   Social History Narrative    Joined  1990, [facility]     Social Determinants of Health     Financial Resource Strain: Low Risk  (4/21/2024)    Overall Financial Resource Strain (CARDIA)     Difficulty of Paying Living Expenses: Not very hard   Food Insecurity: No Food Insecurity (4/21/2024)    Hunger Vital Sign     Worried About Running Out of Food in the Last Year: Never true     Ran Out of Food in the Last  Year: Never true   Transportation Needs: Unmet Transportation Needs (4/21/2024)    PRAPARE - Transportation     Lack of Transportation (Medical): Yes     Lack of Transportation (Non-Medical): No   Physical Activity: Sufficiently Active (4/10/2024)    Exercise Vital Sign     Days of Exercise per Week: 4 days     Minutes of Exercise per Session: 60 min   Stress: Stress Concern Present (4/21/2024)    [employer] [place] [employer] - Occupational Stress Questionnaire     Feeling of Stress : To some extent   Social Connections: Unknown (4/10/2024)    Social Connection and Isolation Panel [NHANES]     Frequency of Communication with Friends and Family: More than three times a week     Frequency of Social Gatherings with Friends and Family: Once a week     Active Member of Clubs or Organizations: Yes     Attends Club or Organization Meetings: More than 4 times per year     Marital Status:    Housing Stability: Low Risk  (4/21/2024)    Housing Stability Vital Sign     Unable to Pay for Housing in the Last Year: No     Number of Times Moved in the Last Year: 0     Homeless in the Last Year: No       Family History   Problem Relation Name Age of Onset    Alcohol abuse Mother      Heart disease Mother      Heart disease Father         Physical Exam     Vitals:    04/22/24 0950 04/22/24 1217 04/22/24 1418 04/22/24 1632   BP:  (!) 164/85  138/65   BP Location:  Left arm  Left arm   Patient Position:  Sitting  Sitting   Pulse:  68  65   Resp: 18 18 18 18   Temp:  97.8 °F (36.6 °C)  97.8 °F (36.6 °C)   TempSrc:  Oral  Oral   SpO2:  99%  100%   Weight:       Height:           Physical Exam  Vitals reviewed.   Constitutional:       General: She is not in acute distress.     Appearance: Normal appearance. She is not toxic-appearing.   HENT:      Head: Normocephalic and atraumatic.      Nose: No rhinorrhea.      Mouth/Throat:      Mouth: Mucous membranes are moist.   Eyes:      Extraocular Movements: Extraocular  movements intact.      Pupils: Pupils are equal, round, and reactive to light.   Cardiovascular:      Rate and Rhythm: Normal rate and regular rhythm.   Pulmonary:      Effort: No respiratory distress.      Breath sounds: No wheezing or rhonchi.   Abdominal:      General: There is no distension.      Palpations: Abdomen is soft.      Tenderness: There is abdominal tenderness (Epigastric/RUQ). There is no guarding or rebound.   Musculoskeletal:         General: No deformity.      Cervical back: No rigidity.   Skin:     General: Skin is warm and dry.      Capillary Refill: Capillary refill takes less than 2 seconds.   Neurological:      General: No focal deficit present.      Mental Status: She is alert and oriented to person, place, and time.             Results     Labs Reviewed   CBC W/ AUTO DIFFERENTIAL - Abnormal; Notable for the following components:       Result Value    Lymph # 0.9 (*)     Gran % 78.9 (*)     Lymph % 11.8 (*)     All other components within normal limits   COMPREHENSIVE METABOLIC PANEL - Abnormal; Notable for the following components:    Potassium 3.4 (*)     CO2 19 (*)     Albumin 3.3 (*)     Alkaline Phosphatase 244 (*)     AST 55 (*)      (*)     All other components within normal limits   HIV 1 / 2 ANTIBODY    Narrative:     Release to patient->Immediate   HEPATITIS C ANTIBODY    Narrative:     Release to patient->Immediate   LIPASE   LACTIC ACID, PLASMA   TYPE & SCREEN   ISTAT LACTATE       Imaging Results              MRI MRCP Without Contrast (Final result)  Result time 04/20/24 00:19:55      Final result by Tai Valderrama DO (04/20/24 00:19:55)                   Impression:      1. Dilation of the common bile duct with a 5.5 mm filling defect in the mid to distal CBD, compatible with choledocholithiasis.  No intrahepatic biliary ductal dilation.  2. Cholelithiasis without evidence of acute cholecystitis.      Electronically signed by: Tai  Jannie  Date:    04/20/2024  Time:    00:19               Narrative:    EXAMINATION:  MRI ABDOMEN WITHOUT CONTRAST MRCP    CLINICAL HISTORY:  concern for biliary ductal obstruction; further evaluation;    TECHNIQUE:  Multiplanar, multisequence magnetic resonance images of the liver and upper abdomen without intravenous contrast. Additionally 2D and 3D MRCP sequences are performed as well as MIP images.    COMPARISON:  CT of the abdomen and pelvis from 04/18/2024.  MRI of the abdomen from 03/19/2024    FINDINGS:  Pulmonary Bases: No gross abnormality.    Liver: The liver is normal in size and demonstrates normal signal.  No evidence of fat or iron deposition.  No focal lesion.    Bile Ducts: There is a 5.5 mm filling defect within the mid to distal common bile duct, compatible with choledocholithiasis.  There is extrahepatic biliary ductal dilation.  The common bile duct measures up to approximately 1.1 cm in maximal diameter, unchanged in comparison with MRI dated 03/19/2024.  The choledocholith is similar in size to prior but is slightly more superiorly positioned than on the prior study.  There is no intra hepatic biliary ductal dilation.    Gallbladder: There are multiple layering gallstones.  No gallbladder wall thickening or pericholecystic fluid.    Pancreas: Unremarkable without focal lesion or pancreatic ductal dilation.    Spleen: Normal size without focal lesion.    Gastrointestinal tract: There are postop changes of gastric bypass.  No bowel wall thickening or obstruction.    Mesentery and retroperitoneum: No ascites, focal fluid collection, or free air.    Lymph nodes: No evidence of lymphadenopathy.    Adrenal Glands: Unremarkable.    Kidneys: Normal without focal lesion or hydronephrosis.  Nonspecific bilateral perinephric fat stranding, likely related to senescent changes.    Aorta and Abdominal Vasculature: No aneurysm.    Body wall: Unremarkable.    Musculoskeletal: Normal marrow signal.  There is  levoconvex scoliosis centered at the thoracolumbar junction.                                            Initial MDM   Medical Decision Making  Patient is a 54-year-old female presenting with known choledocholithiasis and cholelithiasis.  Has been having worsening pain and fevers for the past few days.  She was seen by surgery yesterday and had a CT of the abdomen/pelvis that showed an increasingly dilated CBD.  Most concerning for superimposed cholecystitis, or ascending cholangitis.  Will start on empiric antibiotics.  Patient had labs and imaging yesterday, do not feel repeat imaging would be indicated at this time.  Will get repeat labs to further evaluate.  Will discuss with General surgery.    Amount and/or Complexity of Data Reviewed  Labs: ordered.    Risk  Prescription drug management.  Decision regarding hospitalization.               Medications Given / Interventions     Medications   acetaminophen tablet 650 mg (has no administration in time range)   enoxaparin injection 40 mg (40 mg Subcutaneous Given 4/22/24 1603)   oxyCODONE immediate release tablet 5 mg (5 mg Oral Given 4/22/24 1418)   piperacillin-tazobactam (ZOSYN) 4.5 g in dextrose 5 % in water (D5W) 100 mL IVPB (MB+) (4.5 g Intravenous New Bag 4/22/24 1511)   amLODIPine tablet 10 mg (10 mg Oral Given 4/22/24 0816)   atorvastatin tablet 40 mg (40 mg Oral Given 4/21/24 2039)   famotidine tablet 20 mg (20 mg Oral Given 4/22/24 0816)   FLUoxetine capsule 80 mg (80 mg Oral Given 4/22/24 0816)   levothyroxine tablet 50 mcg (50 mcg Oral Given 4/22/24 0504)   propranoloL tablet 30 mg (30 mg Oral Given 4/22/24 0816)   prochlorperazine injection Soln 5 mg (5 mg Intravenous Given 4/22/24 1539)   lactated ringers bolus 1,000 mL (0 mLs Intravenous Stopped 4/19/24 1809)   piperacillin-tazobactam (ZOSYN) 4.5 g in dextrose 5 % in water (D5W) 100 mL IVPB (MB+) (0 g Intravenous Stopped 4/19/24 1701)   morphine injection 4 mg (4 mg Intravenous Given 4/19/24 1705)    ondansetron injection 4 mg (4 mg Intravenous Given 4/19/24 1705)   potassium chloride CR capsule 30 mEq (30 mEq Oral Given 4/21/24 1216)       Procedures     ED POCUS Performed: No    Reassessment and ED Course      Patient admitted to General surgery         Final diagnoses:  [K81.9] Cholecystitis  [K80.50] Choledocholithiasis (Primary)           Dispo      ED Disposition Condition    Admit                              Alysa Aceves MD  04/22/24 [id number]

## 2024-04-19 NOTE — H&P
Please see consult note dated 4/19/24      Estuardo Phillips MD   General Surgery, PGY4  [phone]

## 2024-04-19 NOTE — CONSULTS
[facility] - Emergency Dept  General Surgery  Consult Note    Patient Name: Anila Roberson  MRN: [id number]  Code Status: No Order  Admission Date: 4/19/2024  Hospital Length of Stay: 0 days  Attending Physician: Alysa Aceves MD  Primary Care Provider: Corie Primary Doctor    Patient information was obtained from patient and past medical records.     Inpatient consult to General surgery  Consult performed by: Estuardo Phillips MD  Consult ordered by: Alysa Aceves MD        Subjective:     Principal Problem: <principal problem not specified>    History of Present Illness: Anila Roberson is a 54yoF who is known to the surgical oncology service. She has a history significant for HTN, HLD, and Que-en-Y gastric bypass who was scheduled for outpatient cholecystectomy with joint intra-operative ERCP with Dr. Tovar. She was seen in our clinic yesterday. The patient began to experience, low grade fevers, worsening right upper quadrant abdominal pain, nausea, PO aversion. Given the worsening pain and fevers, she was instructed to present to the emergency department.     After the clinic visit yesterday, her CMP was significant for elevated ALP, AST, and ALT. Her CT scan demonstrates continued common bile duct dilation to 1cm. In the emergency department today, she continues to endorse epigastric abdominal pain and remains tender. She is not jaundiced and does not demonstrate signs of peritonitis.     She has had a previous bypass and hysterectomy. She takes no blood thinning medications.     Current Facility-Administered Medications   Medication Dose Route Frequency Provider Last Rate Last Admin    lactated ringers bolus 1,000 mL  1,000 mL Intravenous ED 1 Time Alysa Aceves MD        morphine injection 4 mg  4 mg Intravenous ED 1 Time Alysa Aceves MD        ondansetron injection 4 mg  4 mg Intravenous ED 1 Time Alysa Aceves MD         Current Outpatient Medications   Medication Sig  Dispense Refill    amLODIPine (NORVASC) 10 MG tablet Take 10 mg by mouth once daily.      atorvastatin (LIPITOR) 80 MG tablet Take 40 mg by mouth every evening.      BANOPHEN 25 mg capsule Take 25 mg by mouth nightly as needed.      butalbital-acetaminophen-caffeine -40 mg (FIORICET, ESGIC) -40 mg per tablet Take 1 tablet by mouth 2 (two) times daily as needed for Headaches.      estradioL (ESTRACE) 1 MG tablet Take 1 tablet by mouth every evening.      famotidine (PEPCID) 20 MG tablet Take 20 mg by mouth 2 (two) times daily.      FLUoxetine 20 MG capsule Take 80 mg by mouth once daily.      HYDROcodone-acetaminophen (NORCO)  mg per tablet Take 1 tablet by mouth every 6 (six) hours as needed for Pain.      levothyroxine (SYNTHROID) 50 MCG tablet Take 50 mcg by mouth before breakfast.      magnesium oxide 420 mg Tab Take 420 mg by mouth once daily.      MYRBETRIQ 50 mg Tb24 Take 1 tablet by mouth every morning.      promethazine (PHENERGAN) 25 MG tablet Take 1 tablet by mouth every 6 (six) hours as needed for Nausea.      propranoloL (INDERAL) 10 MG tablet Take 30 mg by mouth 2 (two) times daily.      tiZANidine (ZANAFLEX) 2 MG tablet Take 2 mg by mouth every 8 (eight) hours as needed.      tolterodine (DETROL LA) 4 MG 24 hr capsule Take 4 mg by mouth every evening.      topiramate (TOPAMAX) 100 MG tablet Take 100 mg by mouth 2 (two) times daily.      zinc sulfate (ZINCATE) 50 mg zinc (220 mg) capsule Take 50 mg by mouth once daily.         Review of patient's allergies indicates:  No Known Allergies    Past Medical History:   Diagnosis Date    Arthritis     Atrial fibrillation     GERD (gastroesophageal reflux disease)     Hyperlipidemia     Hypertension     Hypothyroidism     Migraine headache     Sleep apnea, unspecified      Past Surgical History:   Procedure Laterality Date    CARDIAC ELECTROPHYSIOLOGY STUDY AND ABLATION  2022    CERVICAL FUSION      cage C4-C7    GASTRIC BYPASS  2002    SPINE  SURGERY      L5-S1     Family History       Problem Relation (Age of Onset)    Alcohol abuse Mother    Heart disease Mother, Father          Tobacco Use    Smoking status: Former     Types: Cigarettes    Smokeless tobacco: Never   Substance and Sexual Activity    Alcohol use: Yes     Alcohol/week: 1.0 standard drink of alcohol     Types: 1 Glasses of wine per week    Drug use: Yes     Types: Marijuana     Comment: medical marijuana    Sexual activity: Not on file     Review of Systems   Constitutional:  Positive for activity change, appetite change and fatigue. Negative for fever and unexpected weight change.   HENT: Negative.     Respiratory:  Negative for cough and shortness of breath.    Cardiovascular:  Negative for chest pain.   Gastrointestinal:  Positive for abdominal distention, abdominal pain and nausea. Negative for constipation, diarrhea and vomiting.   Endocrine: Negative.    Musculoskeletal: Negative.    Skin: Negative.  Negative for color change.     Objective:     Vital Signs (Most Recent):  Temp: 98.1 °F (36.7 °C) (04/19/24 1219)  Pulse: 84 (04/19/24 1435)  Resp: 18 (04/19/24 1435)  BP: 139/79 (04/19/24 1435)  SpO2: 99 % (04/19/24 1435) Vital Signs (24h Range):  Temp:  [98.1 °F (36.7 °C)] 98.1 °F (36.7 °C)  Pulse:  [82-84] 84  Resp:  [18-20] 18  SpO2:  [98 %-99 %] 99 %  BP: (129-139)/(61-79) 139/79     Weight: 78.9 kg (174 lb)  Body mass index is 31.83 kg/m².     Physical Exam  Vitals and nursing note reviewed.   Constitutional:       General: She is not in acute distress.     Appearance: Normal appearance.   HENT:      Nose: Nose normal.      Mouth/Throat:      Mouth: Mucous membranes are moist.   Cardiovascular:      Rate and Rhythm: Normal rate and regular rhythm.   Pulmonary:      Effort: Pulmonary effort is normal. No respiratory distress.   Abdominal:      Comments: Abdomen soft, non-distended  Tender to palpation at the RUQ  No evidence of peritonitis  Previous surgical incisions well healed    Musculoskeletal:         General: Normal range of motion.   Skin:     General: Skin is warm.      Coloration: Skin is not jaundiced.   Neurological:      General: No focal deficit present.      Mental Status: She is alert.            I have reviewed all pertinent lab results within the past 24 hours.  CBC:   Recent Labs   Lab 04/19/24  1601   WBC 7.40   RBC 4.17   HGB 12.9   HCT 39.9      MCV 96   MCH 30.9   MCHC 32.3     CMP:   Recent Labs   Lab 04/19/24  1601   GLU 89   CALCIUM 9.6   ALBUMIN 3.3*   PROT 7.6      K 3.4*   CO2 19*      BUN 12   CREATININE 0.8   ALKPHOS 244*   *   AST 55*   BILITOT 0.4       Significant Diagnostics:  I have reviewed all pertinent imaging results/findings within the past 24 hours.    Assessment/Plan:     Choledocholithiasis  Anila Roberson is a 54yoF who is known to the surgical oncology service. She has a history of Que-en-Y gastric bypass and presents with an ascending cholangitis picture.     - admit to surgical oncology   - labs and imaging reviewed   - her LFTs are downtrending, bilirubin remains normal   - CT scan demonstrates persistent biliary ductal dilation  - IV antibiotics  - MRCP for further evaluation  - NPO, mIVFs  - will consult [facility] service to make them aware if MRCP is positive, will plan for intra-operative ERCP this weekend.   - pain and anti-emetics PRN        VTE Risk Mitigation (From admission, onward)      None            Thank you for your consult. I will follow-up with patient. Please contact us if you have any additional questions.    Estuardo Phillips MD  General Surgery  [facility] - Emergency Dept

## 2024-04-20 LAB
ALBUMIN SERPL BCP-MCNC: 2.6 G/DL (ref 3.5–5.2)
ALP SERPL-CCNC: 243 U/L (ref 55–135)
ALT SERPL W/O P-5'-P-CCNC: 85 U/L (ref 10–44)
ANION GAP SERPL CALC-SCNC: 8 MMOL/L (ref 8–16)
AST SERPL-CCNC: 62 U/L (ref 10–40)
BASOPHILS # BLD AUTO: 0.02 K/UL (ref 0–0.2)
BASOPHILS NFR BLD: 0.3 % (ref 0–1.9)
BILIRUB SERPL-MCNC: 0.4 MG/DL (ref 0.1–1)
BILIRUB UR QL STRIP: NEGATIVE
BUN SERPL-MCNC: 8 MG/DL (ref 6–20)
CALCIUM SERPL-MCNC: 8.5 MG/DL (ref 8.7–10.5)
CHLORIDE SERPL-SCNC: 105 MMOL/L (ref 95–110)
CLARITY UR REFRACT.AUTO: CLEAR
CO2 SERPL-SCNC: 20 MMOL/L (ref 23–29)
COLOR UR AUTO: YELLOW
CREAT SERPL-MCNC: 0.6 MG/DL (ref 0.5–1.4)
DIFFERENTIAL METHOD BLD: ABNORMAL
EOSINOPHIL # BLD AUTO: 0 K/UL (ref 0–0.5)
EOSINOPHIL NFR BLD: 0.3 % (ref 0–8)
ERYTHROCYTE [DISTWIDTH] IN BLOOD BY AUTOMATED COUNT: 13 % (ref 11.5–14.5)
EST. GFR  (NO RACE VARIABLE): >60 ML/MIN/1.73 M^2
GLUCOSE SERPL-MCNC: 99 MG/DL (ref 70–110)
GLUCOSE UR QL STRIP: NEGATIVE
HCT VFR BLD AUTO: 35.3 % (ref 37–48.5)
HGB BLD-MCNC: 11.4 G/DL (ref 12–16)
HGB UR QL STRIP: NEGATIVE
IMM GRANULOCYTES # BLD AUTO: 0.01 K/UL (ref 0–0.04)
IMM GRANULOCYTES NFR BLD AUTO: 0.2 % (ref 0–0.5)
KETONES UR QL STRIP: ABNORMAL
LEUKOCYTE ESTERASE UR QL STRIP: NEGATIVE
LYMPHOCYTES # BLD AUTO: 0.9 K/UL (ref 1–4.8)
LYMPHOCYTES NFR BLD: 13.9 % (ref 18–48)
MAGNESIUM SERPL-MCNC: 2 MG/DL (ref 1.6–2.6)
MCH RBC QN AUTO: 31.5 PG (ref 27–31)
MCHC RBC AUTO-ENTMCNC: 32.3 G/DL (ref 32–36)
MCV RBC AUTO: 98 FL (ref 82–98)
MONOCYTES # BLD AUTO: 1 K/UL (ref 0.3–1)
MONOCYTES NFR BLD: 16.3 % (ref 4–15)
NEUTROPHILS # BLD AUTO: 4.3 K/UL (ref 1.8–7.7)
NEUTROPHILS NFR BLD: 69 % (ref 38–73)
NITRITE UR QL STRIP: NEGATIVE
NRBC BLD-RTO: 0 /100 WBC
PH UR STRIP: 6 [PH] (ref 5–8)
PHOSPHATE SERPL-MCNC: 2.2 MG/DL (ref 2.7–4.5)
PLATELET # BLD AUTO: 176 K/UL (ref 150–450)
PMV BLD AUTO: 10.1 FL (ref 9.2–12.9)
POTASSIUM SERPL-SCNC: 3.7 MMOL/L (ref 3.5–5.1)
PROT SERPL-MCNC: 6 G/DL (ref 6–8.4)
PROT UR QL STRIP: ABNORMAL
RBC # BLD AUTO: 3.62 M/UL (ref 4–5.4)
SODIUM SERPL-SCNC: 133 MMOL/L (ref 136–145)
SP GR UR STRIP: 1.01 (ref 1–1.03)
URN SPEC COLLECT METH UR: ABNORMAL
WBC # BLD AUTO: 6.18 K/UL (ref 3.9–12.7)

## 2024-04-20 PROCEDURE — 83735 ASSAY OF MAGNESIUM: CPT | Performed by: STUDENT IN AN ORGANIZED HEALTH CARE EDUCATION/TRAINING PROGRAM

## 2024-04-20 PROCEDURE — 80053 COMPREHEN METABOLIC PANEL: CPT | Performed by: STUDENT IN AN ORGANIZED HEALTH CARE EDUCATION/TRAINING PROGRAM

## 2024-04-20 PROCEDURE — 99232 SBSQ HOSP IP/OBS MODERATE 35: CPT | Mod: GC,,, | Performed by: SURGERY

## 2024-04-20 PROCEDURE — 99222 1ST HOSP IP/OBS MODERATE 55: CPT | Mod: GC,,, | Performed by: INTERNAL MEDICINE

## 2024-04-20 PROCEDURE — 84100 ASSAY OF PHOSPHORUS: CPT | Performed by: STUDENT IN AN ORGANIZED HEALTH CARE EDUCATION/TRAINING PROGRAM

## 2024-04-20 PROCEDURE — 81003 URINALYSIS AUTO W/O SCOPE: CPT | Performed by: STUDENT IN AN ORGANIZED HEALTH CARE EDUCATION/TRAINING PROGRAM

## 2024-04-20 PROCEDURE — 63600175 PHARM REV CODE 636 W HCPCS: Performed by: STUDENT IN AN ORGANIZED HEALTH CARE EDUCATION/TRAINING PROGRAM

## 2024-04-20 PROCEDURE — 20600001 HC STEP DOWN PRIVATE ROOM

## 2024-04-20 PROCEDURE — 25000003 PHARM REV CODE 250: Performed by: STUDENT IN AN ORGANIZED HEALTH CARE EDUCATION/TRAINING PROGRAM

## 2024-04-20 PROCEDURE — 85025 COMPLETE CBC W/AUTO DIFF WBC: CPT | Performed by: STUDENT IN AN ORGANIZED HEALTH CARE EDUCATION/TRAINING PROGRAM

## 2024-04-20 PROCEDURE — 63600175 PHARM REV CODE 636 W HCPCS

## 2024-04-20 PROCEDURE — 36415 COLL VENOUS BLD VENIPUNCTURE: CPT | Performed by: STUDENT IN AN ORGANIZED HEALTH CARE EDUCATION/TRAINING PROGRAM

## 2024-04-20 RX ORDER — PROCHLORPERAZINE EDISYLATE 5 MG/ML
5 INJECTION INTRAMUSCULAR; INTRAVENOUS EVERY 6 HOURS PRN
Status: DISCONTINUED | OUTPATIENT
Start: 2024-04-20 | End: 2024-04-26 | Stop reason: HOSPADM

## 2024-04-20 RX ADMIN — FAMOTIDINE 20 MG: 20 TABLET ORAL at 08:04

## 2024-04-20 RX ADMIN — PIPERACILLIN SODIUM AND TAZOBACTAM SODIUM 4.5 G: 4; .5 INJECTION, POWDER, FOR SOLUTION INTRAVENOUS at 08:04

## 2024-04-20 RX ADMIN — PIPERACILLIN SODIUM AND TAZOBACTAM SODIUM 4.5 G: 4; .5 INJECTION, POWDER, FOR SOLUTION INTRAVENOUS at 04:04

## 2024-04-20 RX ADMIN — ONDANSETRON 8 MG: 8 TABLET, ORALLY DISINTEGRATING ORAL at 08:04

## 2024-04-20 RX ADMIN — ATORVASTATIN CALCIUM 40 MG: 40 TABLET, FILM COATED ORAL at 08:04

## 2024-04-20 RX ADMIN — PIPERACILLIN SODIUM AND TAZOBACTAM SODIUM 4.5 G: 4; .5 INJECTION, POWDER, FOR SOLUTION INTRAVENOUS at 11:04

## 2024-04-20 RX ADMIN — SODIUM CHLORIDE, POTASSIUM CHLORIDE, SODIUM LACTATE AND CALCIUM CHLORIDE: 600; 310; 30; 20 INJECTION, SOLUTION INTRAVENOUS at 12:04

## 2024-04-20 RX ADMIN — PROPRANOLOL HYDROCHLORIDE 30 MG: 20 TABLET ORAL at 08:04

## 2024-04-20 RX ADMIN — OXYCODONE 5 MG: 5 TABLET ORAL at 01:04

## 2024-04-20 RX ADMIN — OXYCODONE 5 MG: 5 TABLET ORAL at 08:04

## 2024-04-20 RX ADMIN — ENOXAPARIN SODIUM 40 MG: 40 INJECTION SUBCUTANEOUS at 04:04

## 2024-04-20 RX ADMIN — AMLODIPINE BESYLATE 10 MG: 10 TABLET ORAL at 08:04

## 2024-04-20 RX ADMIN — LEVOTHYROXINE SODIUM 50 MCG: 50 TABLET ORAL at 06:04

## 2024-04-20 RX ADMIN — PIPERACILLIN SODIUM AND TAZOBACTAM SODIUM 4.5 G: 4; .5 INJECTION, POWDER, FOR SOLUTION INTRAVENOUS at 12:04

## 2024-04-20 RX ADMIN — OXYCODONE 5 MG: 5 TABLET ORAL at 02:04

## 2024-04-20 RX ADMIN — FLUOXETINE HYDROCHLORIDE 80 MG: 20 CAPSULE ORAL at 08:04

## 2024-04-20 RX ADMIN — PROCHLORPERAZINE EDISYLATE 5 MG: 5 INJECTION INTRAMUSCULAR; INTRAVENOUS at 10:04

## 2024-04-20 NOTE — CONSULTS
[facility]  Advanced Endoscopy Service  Consult Note    Patient Name: Anila Roberson  MRN: [id number]  Admission Date: 2024  Hospital Length of Stay: 1 days  Code Status: Full Code   Attending Provider: Estuardo Underwood MD   Consulting Provider: Lyn Soler DO  Primary Care Physician: No, Primary Doctor  Principal Problem:Choledocholithiasis    Inpatient consult to Advanced Endoscopy Service (AES)  Consult performed by: Lyn Soler DO  Consult ordered by: Estuardo Phillips MD        Subjective:     HPI: Anila Roberson is a 54 y.o. female with HTN, HLD and s/p RNY gastric bypass () who was scheduled for outpatient cholecystectomy with joint intra-operative ERCP that presents to [facility] with low grade fevers, RUQ pain and nausea. Admitted to the surgical oncology service. AES consulted for inpatient intraoperative ERCP with possible plans for surgical intervention this weekend. She reports that her pain is worst in the epigastric region. She does report intermittent nausea and vomiting. She is not on any blood thinners.     Afebrile and HDS. Labs at the time of consult: WBC 7.4K, , , T bili 0.4, AST 55 and .     Imagin24- MRCP- Dilation of the common bile duct with a 5.5 mm filling defect in the mid to distal CBD, compatible with choledocholithiasis. No intrahepatic biliary ductal dilation. Cholelithiasis without evidence of acute cholecystitis.     Past Medical History:   Diagnosis Date    Arthritis     Atrial fibrillation     GERD (gastroesophageal reflux disease)     Hyperlipidemia     Hypertension     Hypothyroidism     Migraine headache     Sleep apnea, unspecified        Past Surgical History:   Procedure Laterality Date    CARDIAC ELECTROPHYSIOLOGY STUDY AND ABLATION      CERVICAL FUSION      cage C4-C7    GASTRIC BYPASS      SPINE SURGERY      L5-S1       Family History   Problem Relation Name Age of Onset    Alcohol abuse Mother       Heart disease Mother      Heart disease Father         Social History     Socioeconomic History    Marital status:    Tobacco Use    Smoking status: Former     Types: Cigarettes    Smokeless tobacco: Never   Substance and Sexual Activity    Alcohol use: Yes     Alcohol/week: 1.0 standard drink of alcohol     Types: 1 Glasses of wine per week    Drug use: Yes     Types: Marijuana     Comment: medical marijuana   Social History Narrative    Joined  1990, [facility]     Social Determinants of Health     Financial Resource Strain: Low Risk  (4/10/2024)    Overall Financial Resource Strain (CARDIA)     Difficulty of Paying Living Expenses: Not very hard   Food Insecurity: No Food Insecurity (4/10/2024)    Hunger Vital Sign     Worried About Running Out of Food in the Last Year: Never true     Ran Out of Food in the Last Year: Never true   Transportation Needs: Unmet Transportation Needs (4/10/2024)    PRAPARE - Transportation     Lack of Transportation (Medical): Yes     Lack of Transportation (Non-Medical): No   Physical Activity: Sufficiently Active (4/10/2024)    Exercise Vital Sign     Days of Exercise per Week: 4 days     Minutes of Exercise per Session: 60 min   Stress: Stress Concern Present (4/10/2024)    [employer] - Occupational Stress Questionnaire     Feeling of Stress : To some extent   Social Connections: Unknown (4/10/2024)    Social Connection and Isolation Panel [NHANES]     Frequency of Communication with Friends and Family: More than three times a week     Frequency of Social Gatherings with Friends and Family: Once a week     Active Member of Clubs or Organizations: Yes     Attends Club or Organization Meetings: More than 4 times per year     Marital Status:    Housing Stability: Unknown (4/10/2024)    Housing Stability Vital Sign     Unable to Pay for Housing in the Last Year: No     Number of Places Lived in the  Last Year: 1     Unstable Housing in the Last Year: Patient declined       No current facility-administered medications on file prior to encounter.     Current Outpatient Medications on File Prior to Encounter   Medication Sig Dispense Refill    amLODIPine (NORVASC) 10 MG tablet Take 10 mg by mouth once daily.      atorvastatin (LIPITOR) 80 MG tablet Take 40 mg by mouth every evening.      BANOPHEN 25 mg capsule Take 25 mg by mouth nightly as needed.      butalbital-acetaminophen-caffeine -40 mg (FIORICET, ESGIC) -40 mg per tablet Take 1 tablet by mouth 2 (two) times daily as needed for Headaches.      estradioL (ESTRACE) 1 MG tablet Take 1 tablet by mouth every evening.      famotidine (PEPCID) 20 MG tablet Take 20 mg by mouth 2 (two) times daily.      FLUoxetine 20 MG capsule Take 80 mg by mouth once daily.      HYDROcodone-acetaminophen (NORCO)  mg per tablet Take 1 tablet by mouth every 6 (six) hours as needed for Pain.      levothyroxine (SYNTHROID) 50 MCG tablet Take 50 mcg by mouth before breakfast.      magnesium oxide 420 mg Tab Take 420 mg by mouth once daily.      MYRBETRIQ 50 mg Tb24 Take 1 tablet by mouth every morning.      promethazine (PHENERGAN) 25 MG tablet Take 1 tablet by mouth every 6 (six) hours as needed for Nausea.      propranoloL (INDERAL) 10 MG tablet Take 30 mg by mouth 2 (two) times daily.      tiZANidine (ZANAFLEX) 2 MG tablet Take 2 mg by mouth every 8 (eight) hours as needed.      tolterodine (DETROL LA) 4 MG 24 hr capsule Take 4 mg by mouth every evening.      topiramate (TOPAMAX) 100 MG tablet Take 100 mg by mouth 2 (two) times daily.      zinc sulfate (ZINCATE) 50 mg zinc (220 mg) capsule Take 50 mg by mouth once daily.         Review of patient's allergies indicates:  No Known Allergies    Review of Systems   Constitutional:  Positive for fever and malaise/fatigue. Negative for chills.   HENT:  Negative for congestion and sore throat.    Respiratory:  Negative  for cough and shortness of breath.    Cardiovascular: Negative.  Negative for chest pain and palpitations.   Gastrointestinal:  Positive for abdominal pain, nausea and vomiting. Negative for constipation and diarrhea.   Genitourinary:  Negative for dysuria and frequency.   Musculoskeletal:  Negative for back pain and myalgias.   Skin:  Negative for itching and rash.   Neurological:  Negative for dizziness and headaches.      Objective:     Vitals:    04/20/24 0106   BP:    Pulse:    Resp: 18   Temp:      Constitutional:  not in acute distress  HENT: Head: Normal, normocephalic, atraumatic.  Eyes: conjunctiva clear and sclera nonicteric  Cardiovascular: regular rate and rhythm  Respiratory: normal chest expansion & respiratory effort   and no accessory muscle use  GI: soft, nondistended, tenderness moderate in the epigastrium, and without rebound  Musculoskeletal: no muscular tenderness noted  Skin: normal color  Neurological: alert, oriented x3  Psychiatric: mood and affect are within normal limits    Significant Labs:  Recent Labs   Lab 04/18/24  0956 04/19/24  1601   HGB 13.6 12.9       Lab Results   Component Value Date    WBC 7.40 04/19/2024    HGB 12.9 04/19/2024    HCT 39.9 04/19/2024    MCV 96 04/19/2024     04/19/2024       Lab Results   Component Value Date     04/19/2024    K 3.4 (L) 04/19/2024     04/19/2024    CO2 19 (L) 04/19/2024    BUN 12 04/19/2024    CREATININE 0.8 04/19/2024    CALCIUM 9.6 04/19/2024    ANIONGAP 10 04/19/2024       Lab Results   Component Value Date     (H) 04/19/2024    AST 55 (H) 04/19/2024    ALKPHOS 244 (H) 04/19/2024    BILITOT 0.4 04/19/2024       Lab Results   Component Value Date    INR 1.0 04/18/2024       Significant Imaging:  Reviewed pertinent radiology findings.       Assessment/Plan:     Anila Roberson is a 54 y.o. female with HTN, HLD and s/p RNY gastric bypass who was scheduled for outpatient cholecystectomy with joint intra-operative ERCP  that presents to [facility] with low grade fevers, RUQ pain and nausea. Admitted to the surgical oncology service. AES consulted for inpatient intraoperative ERCP with possible plans for surgical intervention this weekend.     Imagin24- MRCP- Dilation of the common bile duct with a 5.5 mm filling defect in the mid to distal CBD, compatible with choledocholithiasis. No intrahepatic biliary ductal dilation. Cholelithiasis without evidence of acute cholecystitis.     Problem List:  Choledocholithiasis   S/p RNY Gastric Bypass   Cholelithiasis     Recommendations:  - Will be available to assist the surgical team in the OR for cholecystectomy with joint intra-operative ERCP; timing TBD pending staffing availability. Tentatively Monday vs Tuesday- discussed between AES staff to Surgical staff   - Please notify AES team if there is significant change in patient's clinical status  - Maintain IV access with 2 large bore IV's  - Please correct any coagulopathy with platelets and FFP for goal of platelets >50K and INR <2.0  - We will continue to follow     Thank you for involving us in the care of Anila Roberson. Please call with any additional questions, concerns or changes in the patient's clinical status. We will continue to follow.     Lyn Soler DO  Gastroenterology Fellow PGY- IV  [facility]

## 2024-04-20 NOTE — PROGRESS NOTES
Pt arrived to floor. VSS on RA. Complaints of pain and nausea. PRN medications administered. LR @ 125 started.

## 2024-04-20 NOTE — NURSING
Nurses Note -- 4 Eyes      4/20/2024   6:54 AM      Skin assessed during: Admit      [x] No Altered Skin Integrity Present    []Prevention Measures Documented      [] Yes- Altered Skin Integrity Present or Discovered   [] LDA Added if Not in Epic (Describe Wound)   [] New Altered Skin Integrity was Present on Admit and Documented in LDA   [] Wound Image Taken    Wound Care Consulted? No    Attending Nurse:  Khanh Rick RN/Staff Member:  Elliot Aguilar LPN

## 2024-04-20 NOTE — ASSESSMENT & PLAN NOTE
Anila Roberson is a 54yoF who is known to the surgical oncology service. She has a history of Que-en-Y gastric bypass and presents with an ascending cholangitis picture.     - patient seen and examined   - liver enzymes persistently elevated   - MRCP with filling defect of the common bile duct   - she will require lap-assisted ERCP during this admission  - AES consulted; staff on staff discussion  - IV antibiotics  - ok for clears  - pain and anti-emetics PRN

## 2024-04-20 NOTE — PROGRESS NOTES
[facility]  General Surgery  Progress Note    Subjective:     History of Present Illness:  Anila Roberson is a 54yoF who is known to the surgical oncology service. She has a history significant for HTN, HLD, and Que-en-Y gastric bypass who was scheduled for outpatient cholecystectomy with joint intra-operative ERCP with Dr. Tovar. She was seen in our clinic yesterday. The patient began to experience, low grade fevers, worsening right upper quadrant abdominal pain, nausea, PO aversion. Given the worsening pain and fevers, she was instructed to present to the emergency department.     After the clinic visit yesterday, her CMP was significant for elevated ALP, AST, and ALT. Her CT scan demonstrates continued common bile duct dilation to 1cm. In the emergency department today, she continues to endorse epigastric abdominal pain and remains tender. She is not jaundiced and does not demonstrate signs of peritonitis.     She has had a previous bypass and hysterectomy. She takes no blood thinning medications.     Post-Op Info:  Procedure(s) (LRB):  ERCP (ENDOSCOPIC RETROGRADE CHOLANGIOPANCREATOGRAPHY) (N/A)         Interval History: Patient seen and examined. Afebrile. Pain remains, although improved. MRCP with filling defect in the common bile duct. She will require lap-assisted ERCP during this admission.     Medications:  Continuous Infusions:  Current Facility-Administered Medications   Medication Dose Route Frequency Last Rate Last Admin     Scheduled Meds:  Current Facility-Administered Medications   Medication Dose Route Frequency    amLODIPine  10 mg Oral Daily    atorvastatin  40 mg Oral QHS    enoxparin  40 mg Subcutaneous Daily    famotidine  20 mg Oral BID    FLUoxetine  80 mg Oral Daily    levothyroxine  50 mcg Oral Before breakfast    piperacillin-tazobactam (Zosyn) IV (PEDS and ADULTS) (extended infusion is not appropriate)  4.5 g Intravenous Q8H    propranoloL  30 mg Oral BID     PRN Meds:  Current  Facility-Administered Medications:     acetaminophen, 650 mg, Oral, Q8H PRN    ondansetron, 8 mg, Oral, Q8H PRN    oxyCODONE, 5 mg, Oral, Q4H PRN    prochlorperazine, 5 mg, Intravenous, Q6H PRN     Review of patient's allergies indicates:  No Known Allergies  Objective:     Vital Signs (Most Recent):  Temp: 98.9 °F (37.2 °C) (04/20/24 0702)  Pulse: 80 (04/20/24 0702)  Resp: 16 (04/20/24 0826)  BP: (!) 141/90 (04/20/24 0702)  SpO2: 98 % (04/20/24 0702) Vital Signs (24h Range):  Temp:  [97.8 °F (36.6 °C)-98.9 °F (37.2 °C)] 98.9 °F (37.2 °C)  Pulse:  [80-84] 80  Resp:  [16-20] 16  SpO2:  [96 %-100 %] 98 %  BP: (107-164)/(61-92) 141/90     Weight: 78.9 kg (174 lb)  Body mass index is 31.83 kg/m².    Intake/Output - Last 3 Shifts         04/18 0700 04/19 0659 04/19 0700 04/20 0659 04/20 0700 04/21 0659    IV Piggyback  95     Total Intake(mL/kg)  95 (1.2)     Net  +95            Stool Occurrence  0 x              Physical Exam  Vitals and nursing note reviewed.   Constitutional:       General: She is not in acute distress.     Appearance: Normal appearance.   HENT:      Nose: Nose normal.      Mouth/Throat:      Mouth: Mucous membranes are moist.   Cardiovascular:      Rate and Rhythm: Normal rate and regular rhythm.   Pulmonary:      Effort: Pulmonary effort is normal. No respiratory distress.   Abdominal:      Comments: Abdomen soft, non-distended  Tender to palpation at the RUQ  No evidence of peritonitis  Previous surgical incisions well healed   Musculoskeletal:         General: Normal range of motion.   Skin:     General: Skin is warm.      Coloration: Skin is not jaundiced.   Neurological:      General: No focal deficit present.      Mental Status: She is alert.          Significant Labs:  I have reviewed all pertinent lab results within the past 24 hours.  CBC:   Recent Labs   Lab 04/20/24  0438   WBC 6.18   RBC 3.62*   HGB 11.4*   HCT 35.3*      MCV 98   MCH 31.5*   MCHC 32.3     CMP:   Recent Labs    Lab 04/20/24  0438   GLU 99   CALCIUM 8.5*   ALBUMIN 2.6*   PROT 6.0   *   K 3.7   CO2 20*      BUN 8   CREATININE 0.6   ALKPHOS 243*   ALT 85*   AST 62*   BILITOT 0.4       Significant Diagnostics:  I have reviewed all pertinent imaging results/findings within the past 24 hours.  Assessment/Plan:     * Choledocholithiasis  Anila Roberson is a 54yoF who is known to the surgical oncology service. She has a history of Que-en-Y gastric bypass and presents with an ascending cholangitis picture.     - patient seen and examined   - liver enzymes persistently elevated   - MRCP with filling defect of the common bile duct   - she will require lap-assisted ERCP during this admission  - AES consulted; staff on staff discussion  - IV antibiotics  - ok for clears  - pain and anti-emetics PRKAYLEE Phillips MD  General Surgery  [facility]

## 2024-04-20 NOTE — PLAN OF CARE
Pt progressing towards goals without complications. Pt has surgery scheduled for tomorrow. Pt has been up walking the halls today but still reports severe pain when she attempted to eat a popsicle so she has only been drinking water since.    Problem: Adult Inpatient Plan of Care  Goal: Plan of Care Review  Outcome: Ongoing, Progressing  Goal: Patient-Specific Goal (Individualized)  Outcome: Ongoing, Progressing  Goal: Absence of Hospital-Acquired Illness or Injury  Outcome: Ongoing, Progressing  Goal: Optimal Comfort and Wellbeing  Outcome: Ongoing, Progressing  Goal: Readiness for Transition of Care  Outcome: Ongoing, Progressing     Problem: Fall Injury Risk  Goal: Absence of Fall and Fall-Related Injury  Outcome: Ongoing, Progressing

## 2024-04-20 NOTE — SUBJECTIVE & OBJECTIVE
Interval History: Patient seen and examined. Afebrile. Pain remains, although improved. MRCP with filling defect in the common bile duct. She will require lap-assisted ERCP during this admission.     Medications:  Continuous Infusions:  Current Facility-Administered Medications   Medication Dose Route Frequency Last Rate Last Admin     Scheduled Meds:  Current Facility-Administered Medications   Medication Dose Route Frequency    amLODIPine  10 mg Oral Daily    atorvastatin  40 mg Oral QHS    enoxparin  40 mg Subcutaneous Daily    famotidine  20 mg Oral BID    FLUoxetine  80 mg Oral Daily    levothyroxine  50 mcg Oral Before breakfast    piperacillin-tazobactam (Zosyn) IV (PEDS and ADULTS) (extended infusion is not appropriate)  4.5 g Intravenous Q8H    propranoloL  30 mg Oral BID     PRN Meds:  Current Facility-Administered Medications:     acetaminophen, 650 mg, Oral, Q8H PRN    ondansetron, 8 mg, Oral, Q8H PRN    oxyCODONE, 5 mg, Oral, Q4H PRN    prochlorperazine, 5 mg, Intravenous, Q6H PRN     Review of patient's allergies indicates:  No Known Allergies  Objective:     Vital Signs (Most Recent):  Temp: 98.9 °F (37.2 °C) (04/20/24 0702)  Pulse: 80 (04/20/24 0702)  Resp: 16 (04/20/24 0826)  BP: (!) 141/90 (04/20/24 0702)  SpO2: 98 % (04/20/24 0702) Vital Signs (24h Range):  Temp:  [97.8 °F (36.6 °C)-98.9 °F (37.2 °C)] 98.9 °F (37.2 °C)  Pulse:  [80-84] 80  Resp:  [16-20] 16  SpO2:  [96 %-100 %] 98 %  BP: (107-164)/(61-92) 141/90     Weight: 78.9 kg (174 lb)  Body mass index is 31.83 kg/m².    Intake/Output - Last 3 Shifts         04/18 0700  04/19 0659 04/19 0700 04/20 0659 04/20 0700 04/21 0659    IV Piggyback  95     Total Intake(mL/kg)  95 (1.2)     Net  +95            Stool Occurrence  0 x              Physical Exam  Vitals and nursing note reviewed.   Constitutional:       General: She is not in acute distress.     Appearance: Normal appearance.   HENT:      Nose: Nose normal.      Mouth/Throat:      Mouth:  Mucous membranes are moist.   Cardiovascular:      Rate and Rhythm: Normal rate and regular rhythm.   Pulmonary:      Effort: Pulmonary effort is normal. No respiratory distress.   Abdominal:      Comments: Abdomen soft, non-distended  Tender to palpation at the RUQ  No evidence of peritonitis  Previous surgical incisions well healed   Musculoskeletal:         General: Normal range of motion.   Skin:     General: Skin is warm.      Coloration: Skin is not jaundiced.   Neurological:      General: No focal deficit present.      Mental Status: She is alert.          Significant Labs:  I have reviewed all pertinent lab results within the past 24 hours.  CBC:   Recent Labs   Lab 04/20/24  0438   WBC 6.18   RBC 3.62*   HGB 11.4*   HCT 35.3*      MCV 98   MCH 31.5*   MCHC 32.3     CMP:   Recent Labs   Lab 04/20/24  0438   GLU 99   CALCIUM 8.5*   ALBUMIN 2.6*   PROT 6.0   *   K 3.7   CO2 20*      BUN 8   CREATININE 0.6   ALKPHOS 243*   ALT 85*   AST 62*   BILITOT 0.4       Significant Diagnostics:  I have reviewed all pertinent imaging results/findings within the past 24 hours.

## 2024-04-21 LAB
ALBUMIN SERPL BCP-MCNC: 2.7 G/DL (ref 3.5–5.2)
ALP SERPL-CCNC: 374 U/L (ref 55–135)
ALT SERPL W/O P-5'-P-CCNC: 118 U/L (ref 10–44)
ANION GAP SERPL CALC-SCNC: 9 MMOL/L (ref 8–16)
AST SERPL-CCNC: 108 U/L (ref 10–40)
BASOPHILS # BLD AUTO: 0.01 K/UL (ref 0–0.2)
BASOPHILS NFR BLD: 0.2 % (ref 0–1.9)
BILIRUB SERPL-MCNC: 0.3 MG/DL (ref 0.1–1)
BUN SERPL-MCNC: 4 MG/DL (ref 6–20)
CALCIUM SERPL-MCNC: 8.9 MG/DL (ref 8.7–10.5)
CHLORIDE SERPL-SCNC: 105 MMOL/L (ref 95–110)
CO2 SERPL-SCNC: 23 MMOL/L (ref 23–29)
CREAT SERPL-MCNC: 0.6 MG/DL (ref 0.5–1.4)
DIFFERENTIAL METHOD BLD: ABNORMAL
EOSINOPHIL # BLD AUTO: 0 K/UL (ref 0–0.5)
EOSINOPHIL NFR BLD: 0.6 % (ref 0–8)
ERYTHROCYTE [DISTWIDTH] IN BLOOD BY AUTOMATED COUNT: 13 % (ref 11.5–14.5)
EST. GFR  (NO RACE VARIABLE): >60 ML/MIN/1.73 M^2
GLUCOSE SERPL-MCNC: 86 MG/DL (ref 70–110)
HCT VFR BLD AUTO: 36.5 % (ref 37–48.5)
HGB BLD-MCNC: 12.1 G/DL (ref 12–16)
IMM GRANULOCYTES # BLD AUTO: 0.02 K/UL (ref 0–0.04)
IMM GRANULOCYTES NFR BLD AUTO: 0.4 % (ref 0–0.5)
LYMPHOCYTES # BLD AUTO: 1 K/UL (ref 1–4.8)
LYMPHOCYTES NFR BLD: 20.6 % (ref 18–48)
MAGNESIUM SERPL-MCNC: 2.2 MG/DL (ref 1.6–2.6)
MCH RBC QN AUTO: 32 PG (ref 27–31)
MCHC RBC AUTO-ENTMCNC: 33.2 G/DL (ref 32–36)
MCV RBC AUTO: 97 FL (ref 82–98)
MONOCYTES # BLD AUTO: 1.1 K/UL (ref 0.3–1)
MONOCYTES NFR BLD: 22.8 % (ref 4–15)
NEUTROPHILS # BLD AUTO: 2.7 K/UL (ref 1.8–7.7)
NEUTROPHILS NFR BLD: 55.4 % (ref 38–73)
NRBC BLD-RTO: 0 /100 WBC
PHOSPHATE SERPL-MCNC: 2.5 MG/DL (ref 2.7–4.5)
PLATELET # BLD AUTO: 203 K/UL (ref 150–450)
PMV BLD AUTO: 9.6 FL (ref 9.2–12.9)
POTASSIUM SERPL-SCNC: 3.4 MMOL/L (ref 3.5–5.1)
PROT SERPL-MCNC: 6.1 G/DL (ref 6–8.4)
RBC # BLD AUTO: 3.78 M/UL (ref 4–5.4)
SODIUM SERPL-SCNC: 137 MMOL/L (ref 136–145)
WBC # BLD AUTO: 4.95 K/UL (ref 3.9–12.7)

## 2024-04-21 PROCEDURE — 99232 SBSQ HOSP IP/OBS MODERATE 35: CPT | Mod: GC,,, | Performed by: SURGERY

## 2024-04-21 PROCEDURE — 84100 ASSAY OF PHOSPHORUS: CPT | Performed by: STUDENT IN AN ORGANIZED HEALTH CARE EDUCATION/TRAINING PROGRAM

## 2024-04-21 PROCEDURE — 63600175 PHARM REV CODE 636 W HCPCS

## 2024-04-21 PROCEDURE — 20600001 HC STEP DOWN PRIVATE ROOM

## 2024-04-21 PROCEDURE — 36415 COLL VENOUS BLD VENIPUNCTURE: CPT | Performed by: STUDENT IN AN ORGANIZED HEALTH CARE EDUCATION/TRAINING PROGRAM

## 2024-04-21 PROCEDURE — 63600175 PHARM REV CODE 636 W HCPCS: Performed by: STUDENT IN AN ORGANIZED HEALTH CARE EDUCATION/TRAINING PROGRAM

## 2024-04-21 PROCEDURE — 25000003 PHARM REV CODE 250

## 2024-04-21 PROCEDURE — 25000003 PHARM REV CODE 250: Performed by: STUDENT IN AN ORGANIZED HEALTH CARE EDUCATION/TRAINING PROGRAM

## 2024-04-21 PROCEDURE — 85025 COMPLETE CBC W/AUTO DIFF WBC: CPT | Performed by: STUDENT IN AN ORGANIZED HEALTH CARE EDUCATION/TRAINING PROGRAM

## 2024-04-21 PROCEDURE — 83735 ASSAY OF MAGNESIUM: CPT | Performed by: STUDENT IN AN ORGANIZED HEALTH CARE EDUCATION/TRAINING PROGRAM

## 2024-04-21 PROCEDURE — 80053 COMPREHEN METABOLIC PANEL: CPT | Performed by: STUDENT IN AN ORGANIZED HEALTH CARE EDUCATION/TRAINING PROGRAM

## 2024-04-21 RX ORDER — POTASSIUM CHLORIDE 750 MG/1
30 CAPSULE, EXTENDED RELEASE ORAL
Status: COMPLETED | OUTPATIENT
Start: 2024-04-21 | End: 2024-04-21

## 2024-04-21 RX ORDER — POTASSIUM CHLORIDE 20 MEQ/1
60 TABLET, EXTENDED RELEASE ORAL ONCE
Status: DISCONTINUED | OUTPATIENT
Start: 2024-04-21 | End: 2024-04-21

## 2024-04-21 RX ADMIN — PROPRANOLOL HYDROCHLORIDE 30 MG: 20 TABLET ORAL at 08:04

## 2024-04-21 RX ADMIN — FAMOTIDINE 20 MG: 20 TABLET ORAL at 08:04

## 2024-04-21 RX ADMIN — OXYCODONE 5 MG: 5 TABLET ORAL at 08:04

## 2024-04-21 RX ADMIN — OXYCODONE 5 MG: 5 TABLET ORAL at 12:04

## 2024-04-21 RX ADMIN — POTASSIUM CHLORIDE 30 MEQ: 10 CAPSULE, COATED, EXTENDED RELEASE ORAL at 09:04

## 2024-04-21 RX ADMIN — FLUOXETINE HYDROCHLORIDE 80 MG: 20 CAPSULE ORAL at 08:04

## 2024-04-21 RX ADMIN — AMLODIPINE BESYLATE 10 MG: 10 TABLET ORAL at 08:04

## 2024-04-21 RX ADMIN — OXYCODONE 5 MG: 5 TABLET ORAL at 04:04

## 2024-04-21 RX ADMIN — PROCHLORPERAZINE EDISYLATE 5 MG: 5 INJECTION INTRAMUSCULAR; INTRAVENOUS at 04:04

## 2024-04-21 RX ADMIN — OXYCODONE 5 MG: 5 TABLET ORAL at 02:04

## 2024-04-21 RX ADMIN — ATORVASTATIN CALCIUM 40 MG: 40 TABLET, FILM COATED ORAL at 08:04

## 2024-04-21 RX ADMIN — LEVOTHYROXINE SODIUM 50 MCG: 50 TABLET ORAL at 06:04

## 2024-04-21 RX ADMIN — PIPERACILLIN SODIUM AND TAZOBACTAM SODIUM 4.5 G: 4; .5 INJECTION, POWDER, FOR SOLUTION INTRAVENOUS at 04:04

## 2024-04-21 RX ADMIN — PIPERACILLIN SODIUM AND TAZOBACTAM SODIUM 4.5 G: 4; .5 INJECTION, POWDER, FOR SOLUTION INTRAVENOUS at 08:04

## 2024-04-21 RX ADMIN — ENOXAPARIN SODIUM 40 MG: 40 INJECTION SUBCUTANEOUS at 04:04

## 2024-04-21 RX ADMIN — POTASSIUM CHLORIDE 30 MEQ: 10 CAPSULE, COATED, EXTENDED RELEASE ORAL at 12:04

## 2024-04-21 NOTE — SUBJECTIVE & OBJECTIVE
Interval History: Patient seen and examined. Procedure delayed secondary to staffing. Tolerating water PO but not much else. Labs remain consistent with smoldering cholangitis. Her  was admitted to the hospital over the past 24hrs so she is very anxious about his well being.     Medications:  Continuous Infusions:  Current Facility-Administered Medications   Medication Dose Route Frequency Last Rate Last Admin     Scheduled Meds:  Current Facility-Administered Medications   Medication Dose Route Frequency    amLODIPine  10 mg Oral Daily    atorvastatin  40 mg Oral QHS    enoxparin  40 mg Subcutaneous Daily    famotidine  20 mg Oral BID    FLUoxetine  80 mg Oral Daily    levothyroxine  50 mcg Oral Before breakfast    piperacillin-tazobactam (Zosyn) IV (PEDS and ADULTS) (extended infusion is not appropriate)  4.5 g Intravenous Q8H    potassium chloride  30 mEq Oral Q2H    propranoloL  30 mg Oral BID     PRN Meds:  Current Facility-Administered Medications:     acetaminophen, 650 mg, Oral, Q8H PRN    oxyCODONE, 5 mg, Oral, Q4H PRN    prochlorperazine, 5 mg, Intravenous, Q6H PRN     Review of patient's allergies indicates:  No Known Allergies  Objective:     Vital Signs (Most Recent):  Temp: 98.6 °F (37 °C) (04/21/24 0738)  Pulse: 68 (04/21/24 0738)  Resp: 18 (04/21/24 0800)  BP: (!) 172/81 (04/21/24 0738)  SpO2: 100 % (04/21/24 0738) Vital Signs (24h Range):  Temp:  [97.8 °F (36.6 °C)-99.6 °F (37.6 °C)] 98.6 °F (37 °C)  Pulse:  [68-81] 68  Resp:  [16-19] 18  SpO2:  [97 %-100 %] 100 %  BP: (121-172)/(70-84) 172/81     Weight: 78.9 kg (174 lb)  Body mass index is 31.83 kg/m².    Intake/Output - Last 3 Shifts         04/19 0700 04/20 0659 04/20 0700 04/21 0659 04/21 0700 04/22 0659    P.O.  480     IV Piggyback 95      Total Intake(mL/kg) 95 (1.2) 480 (6.1)     Net +95 +480            Urine Occurrence  5 x     Stool Occurrence 0 x               Physical Exam  Vitals and nursing note reviewed.   Constitutional:        General: She is not in acute distress.     Appearance: Normal appearance.   HENT:      Nose: Nose normal.      Mouth/Throat:      Mouth: Mucous membranes are moist.   Cardiovascular:      Rate and Rhythm: Normal rate and regular rhythm.   Pulmonary:      Effort: Pulmonary effort is normal. No respiratory distress.   Abdominal:      Comments: Abdomen soft, non-distended  Tender to palpation at the RUQ  No evidence of peritonitis  Previous surgical incisions well healed   Musculoskeletal:         General: Normal range of motion.   Skin:     General: Skin is warm.      Coloration: Skin is not jaundiced.   Neurological:      General: No focal deficit present.      Mental Status: She is alert.          Significant Labs:  I have reviewed all pertinent lab results within the past 24 hours.  CBC:   Recent Labs   Lab 04/21/24  0537   WBC 4.95   RBC 3.78*   HGB 12.1   HCT 36.5*      MCV 97   MCH 32.0*   MCHC 33.2     CMP:   Recent Labs   Lab 04/21/24  0537   GLU 86   CALCIUM 8.9   ALBUMIN 2.7*   PROT 6.1      K 3.4*   CO2 23      BUN 4*   CREATININE 0.6   ALKPHOS 374*   *   *   BILITOT 0.3       Significant Diagnostics:  I have reviewed all pertinent imaging results/findings within the past 24 hours.

## 2024-04-21 NOTE — ASSESSMENT & PLAN NOTE
Anila Roberson is a 54yoF who is known to the surgical oncology service. She has a history of Que-en-Y gastric bypass and presents with an ascending cholangitis picture.     - patient seen and examined   - liver enzymes persistently elevated   - MRCP with filling defect of the common bile duct   - she will require lap-assisted ERCP during this admission  - AES consulted;   - staff on staff discussions regarding timing of procedure  - continue IV antibiotics  - clear liquid diet  - pain and anti-emetics PRN

## 2024-04-21 NOTE — PROGRESS NOTES
[facility] - [facility]  General Surgery  Progress Note    Subjective:     History of Present Illness:  Anila Roberson is a 54yoF who is known to the surgical oncology service. She has a history significant for HTN, HLD, and Que-en-Y gastric bypass who was scheduled for outpatient cholecystectomy with joint intra-operative ERCP with Dr. Tovar. She was seen in our clinic yesterday. The patient began to experience, low grade fevers, worsening right upper quadrant abdominal pain, nausea, PO aversion. Given the worsening pain and fevers, she was instructed to present to the emergency department.     After the clinic visit yesterday, her CMP was significant for elevated ALP, AST, and ALT. Her CT scan demonstrates continued common bile duct dilation to 1cm. In the emergency department today, she continues to endorse epigastric abdominal pain and remains tender. She is not jaundiced and does not demonstrate signs of peritonitis.     She has had a previous bypass and hysterectomy. She takes no blood thinning medications.     Post-Op Info:  Procedure(s) (LRB):  ERCP (ENDOSCOPIC RETROGRADE CHOLANGIOPANCREATOGRAPHY) (N/A)         Interval History: Patient seen and examined. Procedure delayed secondary to staffing. Tolerating water PO but not much else. Labs remain consistent with smoldering cholangitis. Her  was admitted to the hospital over the past 24hrs so she is very anxious about his well being.     Medications:  Continuous Infusions:  Current Facility-Administered Medications   Medication Dose Route Frequency Last Rate Last Admin     Scheduled Meds:  Current Facility-Administered Medications   Medication Dose Route Frequency    amLODIPine  10 mg Oral Daily    atorvastatin  40 mg Oral QHS    enoxparin  40 mg Subcutaneous Daily    famotidine  20 mg Oral BID    FLUoxetine  80 mg Oral Daily    levothyroxine  50 mcg Oral Before breakfast    piperacillin-tazobactam (Zosyn) IV (PEDS and ADULTS) (extended infusion is not  appropriate)  4.5 g Intravenous Q8H    potassium chloride  30 mEq Oral Q2H    propranoloL  30 mg Oral BID     PRN Meds:  Current Facility-Administered Medications:     acetaminophen, 650 mg, Oral, Q8H PRN    oxyCODONE, 5 mg, Oral, Q4H PRN    prochlorperazine, 5 mg, Intravenous, Q6H PRN     Review of patient's allergies indicates:  No Known Allergies  Objective:     Vital Signs (Most Recent):  Temp: 98.6 °F (37 °C) (04/21/24 0738)  Pulse: 68 (04/21/24 0738)  Resp: 18 (04/21/24 0800)  BP: (!) 172/81 (04/21/24 0738)  SpO2: 100 % (04/21/24 0738) Vital Signs (24h Range):  Temp:  [97.8 °F (36.6 °C)-99.6 °F (37.6 °C)] 98.6 °F (37 °C)  Pulse:  [68-81] 68  Resp:  [16-19] 18  SpO2:  [97 %-100 %] 100 %  BP: (121-172)/(70-84) 172/81     Weight: 78.9 kg (174 lb)  Body mass index is 31.83 kg/m².    Intake/Output - Last 3 Shifts         04/19 0700 04/20 0659 04/20 0700 04/21 0659 04/21 0700 04/22 0659    P.O.  480     IV Piggyback 95      Total Intake(mL/kg) 95 (1.2) 480 (6.1)     Net +95 +480            Urine Occurrence  5 x     Stool Occurrence 0 x               Physical Exam  Vitals and nursing note reviewed.   Constitutional:       General: She is not in acute distress.     Appearance: Normal appearance.   HENT:      Nose: Nose normal.      Mouth/Throat:      Mouth: Mucous membranes are moist.   Cardiovascular:      Rate and Rhythm: Normal rate and regular rhythm.   Pulmonary:      Effort: Pulmonary effort is normal. No respiratory distress.   Abdominal:      Comments: Abdomen soft, non-distended  Tender to palpation at the RUQ  No evidence of peritonitis  Previous surgical incisions well healed   Musculoskeletal:         General: Normal range of motion.   Skin:     General: Skin is warm.      Coloration: Skin is not jaundiced.   Neurological:      General: No focal deficit present.      Mental Status: She is alert.          Significant Labs:  I have reviewed all pertinent lab results within the past 24 hours.  CBC:    Recent Labs   Lab 04/21/24  0537   WBC 4.95   RBC 3.78*   HGB 12.1   HCT 36.5*      MCV 97   MCH 32.0*   MCHC 33.2     CMP:   Recent Labs   Lab 04/21/24  0537   GLU 86   CALCIUM 8.9   ALBUMIN 2.7*   PROT 6.1      K 3.4*   CO2 23      BUN 4*   CREATININE 0.6   ALKPHOS 374*   *   *   BILITOT 0.3       Significant Diagnostics:  I have reviewed all pertinent imaging results/findings within the past 24 hours.  Assessment/Plan:     * Choledocholithiasis  Anila Roberson is a 54yoF who is known to the surgical oncology service. She has a history of Que-en-Y gastric bypass and presents with an ascending cholangitis picture.     - patient seen and examined   - liver enzymes persistently elevated   - MRCP with filling defect of the common bile duct   - she will require lap-assisted ERCP during this admission  - AES consulted;   - staff on staff discussions regarding timing of procedure  - continue IV antibiotics  - clear liquid diet  - pain and anti-emetics PRN          Estuardo Phillips MD  General Surgery  [facility]

## 2024-04-21 NOTE — PLAN OF CARE
"  Problem: Adult Inpatient Plan of Care  Goal: Plan of Care Review  Outcome: Ongoing, Progressing  Goal: Patient-Specific Goal (Individualized)  Outcome: Ongoing, Progressing  Goal: Absence of Hospital-Acquired Illness or Injury  Outcome: Ongoing, Progressing   [facility] Plan of Care Note    Dx:   Choledocholithiasis [K80.50]  Cholecystitis [K81.9]    Shift Events: Pt ambulated down hallways very well, c/o of pain see mar flowsheet.  Neuro: WNL  Vital Signs: /77 (BP Location: Left arm, Patient Position: Lying)   Pulse 70   Temp 98.1 °F (36.7 °C) (Oral)   Resp 18   Ht 5' 2" (1.575 m)   Wt 78.9 kg (174 lb)   SpO2 97%   Breastfeeding No   BMI 31.83 kg/m²     Respiratory: WNL    Diet: Diet Clear Liquid No Soft Drinks  Dietary nutrition supplements Boost Breeze - Any flavor    Is patient tolerating current diet? No     GTTS: IV Zosyn,    Urine Output/Bowel Movement:   No intake/output data recorded.  Last Bowel Movement: 04/19/24      Drains/Tubes/Tube Feeds (include total output/shift):   No intake/output data recorded.      Lines: 20g rt FA, 22g left wrist      Accuchecks: NO    Skin: no issues     Fall Risk Score: 3    Activity level? Independent     Any scheduled procedures? ERCP/    Any safety concerns? NO    Other: worried about  who was admitted to [facility] last night.    "

## 2024-04-21 NOTE — PROGRESS NOTES
Seen with Madelyn. 55 yo female with history of rich en y gastric bypass with symptoms of cholangitis. Her symptoms resolved with antibiotics alone, but she has had more epigastric pain and low grade fevers over the past week. Tbili normal but other LFTs mildly elevated. Will get CT to evaluate for cholecystitis or pancreatitis. I recommended that she go to the ED, but she takes care of her  who has multiple medical problems and wanted to do the CT as an outpatient. She knows to come back for high fevers or persistent severe pain.     Estuardo Underwood MD  Surgical Oncology

## 2024-04-21 NOTE — PLAN OF CARE
Pt is progressing towards goals with minimal complications. V/S WDL, oral intake is less than desired due to pain. Pt is hopeful her surgery will proceed soon. Pt continues to ambulate in halls when able.    Problem: Adult Inpatient Plan of Care  Goal: Plan of Care Review  Outcome: Ongoing, Progressing  Goal: Patient-Specific Goal (Individualized)  Outcome: Ongoing, Progressing  Goal: Absence of Hospital-Acquired Illness or Injury  Outcome: Ongoing, Progressing  Goal: Optimal Comfort and Wellbeing  Outcome: Ongoing, Progressing  Goal: Readiness for Transition of Care  Outcome: Ongoing, Progressing     Problem: Fall Injury Risk  Goal: Absence of Fall and Fall-Related Injury  Outcome: Ongoing, Progressing

## 2024-04-22 ENCOUNTER — ANESTHESIA EVENT (OUTPATIENT)
Dept: SURGERY | Facility: HOSPITAL | Age: 54
DRG: 418 | End: 2024-04-22
Payer: MEDICARE

## 2024-04-22 LAB
ALBUMIN SERPL BCP-MCNC: 2.7 G/DL (ref 3.5–5.2)
ALP SERPL-CCNC: 325 U/L (ref 55–135)
ALT SERPL W/O P-5'-P-CCNC: 93 U/L (ref 10–44)
ANION GAP SERPL CALC-SCNC: 10 MMOL/L (ref 8–16)
AST SERPL-CCNC: 66 U/L (ref 10–40)
BASOPHILS # BLD AUTO: 0.01 K/UL (ref 0–0.2)
BASOPHILS NFR BLD: 0.2 % (ref 0–1.9)
BILIRUB SERPL-MCNC: 0.3 MG/DL (ref 0.1–1)
BUN SERPL-MCNC: 4 MG/DL (ref 6–20)
CALCIUM SERPL-MCNC: 9 MG/DL (ref 8.7–10.5)
CHLORIDE SERPL-SCNC: 103 MMOL/L (ref 95–110)
CO2 SERPL-SCNC: 22 MMOL/L (ref 23–29)
CREAT SERPL-MCNC: 0.6 MG/DL (ref 0.5–1.4)
DIFFERENTIAL METHOD BLD: ABNORMAL
EOSINOPHIL # BLD AUTO: 0.1 K/UL (ref 0–0.5)
EOSINOPHIL NFR BLD: 0.8 % (ref 0–8)
ERYTHROCYTE [DISTWIDTH] IN BLOOD BY AUTOMATED COUNT: 12.9 % (ref 11.5–14.5)
EST. GFR  (NO RACE VARIABLE): >60 ML/MIN/1.73 M^2
GLUCOSE SERPL-MCNC: 83 MG/DL (ref 70–110)
HCT VFR BLD AUTO: 38.2 % (ref 37–48.5)
HGB BLD-MCNC: 12.3 G/DL (ref 12–16)
IMM GRANULOCYTES # BLD AUTO: 0.02 K/UL (ref 0–0.04)
IMM GRANULOCYTES NFR BLD AUTO: 0.3 % (ref 0–0.5)
LYMPHOCYTES # BLD AUTO: 1.5 K/UL (ref 1–4.8)
LYMPHOCYTES NFR BLD: 25.3 % (ref 18–48)
MAGNESIUM SERPL-MCNC: 2.1 MG/DL (ref 1.6–2.6)
MCH RBC QN AUTO: 31.3 PG (ref 27–31)
MCHC RBC AUTO-ENTMCNC: 32.2 G/DL (ref 32–36)
MCV RBC AUTO: 97 FL (ref 82–98)
MONOCYTES # BLD AUTO: 0.9 K/UL (ref 0.3–1)
MONOCYTES NFR BLD: 15.2 % (ref 4–15)
NEUTROPHILS # BLD AUTO: 3.5 K/UL (ref 1.8–7.7)
NEUTROPHILS NFR BLD: 58.2 % (ref 38–73)
NRBC BLD-RTO: 0 /100 WBC
PHOSPHATE SERPL-MCNC: 2.7 MG/DL (ref 2.7–4.5)
PLATELET # BLD AUTO: 223 K/UL (ref 150–450)
PMV BLD AUTO: 10 FL (ref 9.2–12.9)
POTASSIUM SERPL-SCNC: 4.2 MMOL/L (ref 3.5–5.1)
PROT SERPL-MCNC: 6.2 G/DL (ref 6–8.4)
RBC # BLD AUTO: 3.93 M/UL (ref 4–5.4)
SODIUM SERPL-SCNC: 135 MMOL/L (ref 136–145)
WBC # BLD AUTO: 5.98 K/UL (ref 3.9–12.7)

## 2024-04-22 PROCEDURE — 36415 COLL VENOUS BLD VENIPUNCTURE: CPT | Performed by: STUDENT IN AN ORGANIZED HEALTH CARE EDUCATION/TRAINING PROGRAM

## 2024-04-22 PROCEDURE — 83735 ASSAY OF MAGNESIUM: CPT | Performed by: STUDENT IN AN ORGANIZED HEALTH CARE EDUCATION/TRAINING PROGRAM

## 2024-04-22 PROCEDURE — 63600175 PHARM REV CODE 636 W HCPCS: Performed by: STUDENT IN AN ORGANIZED HEALTH CARE EDUCATION/TRAINING PROGRAM

## 2024-04-22 PROCEDURE — 20600001 HC STEP DOWN PRIVATE ROOM

## 2024-04-22 PROCEDURE — 25000003 PHARM REV CODE 250: Performed by: STUDENT IN AN ORGANIZED HEALTH CARE EDUCATION/TRAINING PROGRAM

## 2024-04-22 PROCEDURE — 80053 COMPREHEN METABOLIC PANEL: CPT | Performed by: STUDENT IN AN ORGANIZED HEALTH CARE EDUCATION/TRAINING PROGRAM

## 2024-04-22 PROCEDURE — 84100 ASSAY OF PHOSPHORUS: CPT | Performed by: STUDENT IN AN ORGANIZED HEALTH CARE EDUCATION/TRAINING PROGRAM

## 2024-04-22 PROCEDURE — 99232 SBSQ HOSP IP/OBS MODERATE 35: CPT | Mod: 57,GC,ICN, | Performed by: SURGERY

## 2024-04-22 PROCEDURE — 85025 COMPLETE CBC W/AUTO DIFF WBC: CPT | Performed by: STUDENT IN AN ORGANIZED HEALTH CARE EDUCATION/TRAINING PROGRAM

## 2024-04-22 PROCEDURE — 63600175 PHARM REV CODE 636 W HCPCS

## 2024-04-22 RX ADMIN — OXYCODONE 5 MG: 5 TABLET ORAL at 05:04

## 2024-04-22 RX ADMIN — ENOXAPARIN SODIUM 40 MG: 40 INJECTION SUBCUTANEOUS at 04:04

## 2024-04-22 RX ADMIN — OXYCODONE 5 MG: 5 TABLET ORAL at 09:04

## 2024-04-22 RX ADMIN — FLUOXETINE HYDROCHLORIDE 80 MG: 20 CAPSULE ORAL at 08:04

## 2024-04-22 RX ADMIN — PROCHLORPERAZINE EDISYLATE 5 MG: 5 INJECTION INTRAMUSCULAR; INTRAVENOUS at 09:04

## 2024-04-22 RX ADMIN — OXYCODONE 5 MG: 5 TABLET ORAL at 12:04

## 2024-04-22 RX ADMIN — OXYCODONE 5 MG: 5 TABLET ORAL at 02:04

## 2024-04-22 RX ADMIN — ATORVASTATIN CALCIUM 40 MG: 40 TABLET, FILM COATED ORAL at 09:04

## 2024-04-22 RX ADMIN — PROCHLORPERAZINE EDISYLATE 5 MG: 5 INJECTION INTRAMUSCULAR; INTRAVENOUS at 12:04

## 2024-04-22 RX ADMIN — FAMOTIDINE 20 MG: 20 TABLET ORAL at 08:04

## 2024-04-22 RX ADMIN — PIPERACILLIN SODIUM AND TAZOBACTAM SODIUM 4.5 G: 4; .5 INJECTION, POWDER, FOR SOLUTION INTRAVENOUS at 03:04

## 2024-04-22 RX ADMIN — LEVOTHYROXINE SODIUM 50 MCG: 50 TABLET ORAL at 05:04

## 2024-04-22 RX ADMIN — PROPRANOLOL HYDROCHLORIDE 30 MG: 20 TABLET ORAL at 08:04

## 2024-04-22 RX ADMIN — FAMOTIDINE 20 MG: 20 TABLET ORAL at 09:04

## 2024-04-22 RX ADMIN — OXYCODONE 5 MG: 5 TABLET ORAL at 06:04

## 2024-04-22 RX ADMIN — PROPRANOLOL HYDROCHLORIDE 30 MG: 20 TABLET ORAL at 09:04

## 2024-04-22 RX ADMIN — AMLODIPINE BESYLATE 10 MG: 10 TABLET ORAL at 08:04

## 2024-04-22 RX ADMIN — PIPERACILLIN SODIUM AND TAZOBACTAM SODIUM 4.5 G: 4; .5 INJECTION, POWDER, FOR SOLUTION INTRAVENOUS at 08:04

## 2024-04-22 RX ADMIN — PROCHLORPERAZINE EDISYLATE 5 MG: 5 INJECTION INTRAMUSCULAR; INTRAVENOUS at 03:04

## 2024-04-22 NOTE — ANESTHESIA PREPROCEDURE EVALUATION
[facility]  Anesthesia Pre-Operative Evaluation         Patient Name: Anila Roberson  YOB: 1970  MRN: [id number]    SUBJECTIVE:     Pre-operative evaluation for Procedure(s) (LRB):  CHOLECYSTECTOMY, LAPAROSCOPIC, WITH CHOLANGIOGRAM (N/A)  ERCP (ENDOSCOPIC RETROGRADE CHOLANGIOPANCREATOGRAPHY) (N/A)     04/22/2024    Anila Roberson is a 54 y.o. female w/ a significant PMHx of HTN, HLD, and Que-en-Y gastric bypass who presents with ascending cholangitis. To OR for lap-assisted ERCP with cholecystectomy.    Patient now presents for the above procedure(s).      LDA:        Peripheral IV - Single Lumen 04/19/24 1621 22 G Left Antecubital (Active)   Site Assessment Clean;Dry;Intact;No redness;No swelling 04/22/24 0333   Extremity Assessment Distal to IV No abnormal discoloration 04/21/24 2000   Line Status Infusing 04/22/24 0333   Dressing Status Clean;Dry;Intact 04/22/24 0333   Dressing Intervention Integrity maintained 04/22/24 0333   Number of days: 2            Peripheral IV - Single Lumen 04/19/24 20 G Anterior;Right Forearm (Active)   Site Assessment Clean;Dry;Intact;No redness;No swelling 04/22/24 0333   Extremity Assessment Distal to IV No redness;No swelling;No abnormal discoloration;No warmth 04/21/24 2000   Line Status Infusing 04/22/24 0333   Dressing Status Clean;Dry;Intact 04/22/24 0333   Dressing Intervention Integrity maintained 04/22/24 0333   Number of days: 3       Prev airway: None documented.    Drips:   Current Facility-Administered Medications   Medication Dose Route Frequency Last Rate Last Admin       Patient Active Problem List   Diagnosis    Choledocholithiasis    H/O gastric bypass in 2002       Review of patient's allergies indicates:  No Known Allergies    Current Inpatient Medications:  Current Facility-Administered Medications   Medication Dose Route Frequency    amLODIPine  10 mg Oral Daily    atorvastatin  40 mg Oral QHS    enoxparin  40 mg Subcutaneous  Daily    famotidine  20 mg Oral BID    FLUoxetine  80 mg Oral Daily    levothyroxine  50 mcg Oral Before breakfast    piperacillin-tazobactam (Zosyn) IV (PEDS and ADULTS) (extended infusion is not appropriate)  4.5 g Intravenous Q8H    propranoloL  30 mg Oral BID       No current facility-administered medications on file prior to encounter.     Current Outpatient Medications on File Prior to Encounter   Medication Sig Dispense Refill    amLODIPine (NORVASC) 10 MG tablet Take 10 mg by mouth once daily.      atorvastatin (LIPITOR) 80 MG tablet Take 40 mg by mouth every evening.      BANOPHEN 25 mg capsule Take 25 mg by mouth nightly as needed.      butalbital-acetaminophen-caffeine -40 mg (FIORICET, ESGIC) -40 mg per tablet Take 1 tablet by mouth 2 (two) times daily as needed for Headaches.      estradioL (ESTRACE) 1 MG tablet Take 1 tablet by mouth every evening.      famotidine (PEPCID) 20 MG tablet Take 20 mg by mouth 2 (two) times daily.      FLUoxetine 20 MG capsule Take 80 mg by mouth once daily.      HYDROcodone-acetaminophen (NORCO)  mg per tablet Take 1 tablet by mouth every 6 (six) hours as needed for Pain.      levothyroxine (SYNTHROID) 50 MCG tablet Take 50 mcg by mouth before breakfast.      magnesium oxide 420 mg Tab Take 420 mg by mouth once daily.      MYRBETRIQ 50 mg Tb24 Take 1 tablet by mouth every morning.      promethazine (PHENERGAN) 25 MG tablet Take 1 tablet by mouth every 6 (six) hours as needed for Nausea.      propranoloL (INDERAL) 10 MG tablet Take 30 mg by mouth 2 (two) times daily.      tiZANidine (ZANAFLEX) 2 MG tablet Take 2 mg by mouth every 8 (eight) hours as needed.      tolterodine (DETROL LA) 4 MG 24 hr capsule Take 4 mg by mouth every evening.      topiramate (TOPAMAX) 100 MG tablet Take 100 mg by mouth 2 (two) times daily.      zinc sulfate (ZINCATE) 50 mg zinc (220 mg) capsule Take 50 mg by mouth once daily.         Past Surgical History:   Procedure Laterality  Date    CARDIAC ELECTROPHYSIOLOGY STUDY AND ABLATION  2022    CERVICAL FUSION      cage C4-C7    GASTRIC BYPASS  2002    SPINE SURGERY      L5-S1       Social History:  Tobacco Use: Medium Risk (4/19/2024)    Patient History     Smoking Tobacco Use: Former     Smokeless Tobacco Use: Never     Passive Exposure: Not on file      Alcohol Use: Alcohol Misuse (4/10/2024)    AUDIT-C     Frequency of Alcohol Consumption: 2-3 times a week     Average Number of Drinks: 3 or 4     Frequency of Binge Drinking: Less than monthly        OBJECTIVE:     Vital Signs Range (Last 24H):  Temp:  [36.4 °C (97.6 °F)-36.8 °C (98.2 °F)]   Pulse:  [63-71]   Resp:  [16-19]   BP: (126-160)/(75-90)   SpO2:  [97 %-100 %]       Significant Labs:  Lab Results   Component Value Date    WBC 5.98 04/22/2024    HGB 12.3 04/22/2024    HCT 38.2 04/22/2024     04/22/2024    ALT 93 (H) 04/22/2024    AST 66 (H) 04/22/2024     (L) 04/22/2024    K 4.2 04/22/2024     04/22/2024    CREATININE 0.6 04/22/2024    BUN 4 (L) 04/22/2024    CO2 22 (L) 04/22/2024    INR 1.0 04/18/2024       Diagnostic Studies: No relevant studies.    EKG:   Results for orders placed or performed during the hospital encounter of 04/19/24   EKG 12-lead    Collection Time: 04/19/24  4:12 PM   Result Value Ref Range    QRS Duration 88 ms    OHS QTC Calculation 459 ms    Narrative    Test Reason : K81.9,    Vent. Rate : 072 BPM     Atrial Rate : 072 BPM     P-R Int : 140 ms          QRS Dur : 088 ms      QT Int : 420 ms       P-R-T Axes : 048 003 -03 degrees     QTc Int : 459 ms    Normal sinus rhythm  Normal ECG  No previous ECGs available  Confirmed by Sahil ROSEN MD ([id number]) on 4/19/2024 4:19:07 PM    Referred By: [email or web address]   SELF           Confirmed By:Sahil ROSEN MD       2D ECHO:  TTE:  No results found for this or any previous visit.    JOHNNY:  No results found for this or any previous visit.    ASSESSMENT/PLAN:         Pre-op Assessment    I have reviewed the Patient  Summary Reports.     I have reviewed the Nursing Notes. I have reviewed the NPO Status.   I have reviewed the Medications.     Review of Systems  Anesthesia Hx:  No problems with previous Anesthesia               Denies Personal Hx of Anesthesia complications.                    Cardiovascular:     Hypertension                                        Pulmonary:        Sleep Apnea                Renal/:  Renal/ Normal                 Hepatic/GI:     GERD             Neurological:      Headaches                                 Endocrine:   Hypothyroidism              Physical Exam  General: Well nourished, Cooperative, Alert and Oriented    Airway:  Mallampati: II   Mouth Opening: Normal  TM Distance: Normal  Tongue: Normal  Neck ROM: Normal ROM    Dental:  Intact    Chest/Lungs:  Clear to auscultation, Normal Respiratory Rate    Heart:  Rate: Normal  Rhythm: Regular Rhythm  Sounds: Normal    Abdomen:  Nontender, Soft, Normal        Anesthesia Plan  Type of Anesthesia, risks & benefits discussed:    Anesthesia Type: Gen ETT  Intra-op Monitoring Plan: Standard ASA Monitors  Post Op Pain Control Plan: multimodal analgesia and IV/PO Opioids PRN  Induction:  IV  Airway Plan: Direct, Post-Induction  Informed Consent: Informed consent signed with the Patient and all parties understand the risks and agree with anesthesia plan.  All questions answered.   ASA Score: 3  Day of Surgery Review of History & Physical: H&P Update referred to the surgeon/provider.    Ready For Surgery From Anesthesia Perspective.     .

## 2024-04-22 NOTE — SUBJECTIVE & OBJECTIVE
Interval History: Patient seen and examined. Remains with epigastric abdominal pain. Labs stable. Plan for OR tomorrow.     Medications:  Continuous Infusions:  Current Facility-Administered Medications   Medication Dose Route Frequency Last Rate Last Admin     Scheduled Meds:  Current Facility-Administered Medications   Medication Dose Route Frequency    amLODIPine  10 mg Oral Daily    atorvastatin  40 mg Oral QHS    enoxparin  40 mg Subcutaneous Daily    famotidine  20 mg Oral BID    FLUoxetine  80 mg Oral Daily    levothyroxine  50 mcg Oral Before breakfast    piperacillin-tazobactam (Zosyn) IV (PEDS and ADULTS) (extended infusion is not appropriate)  4.5 g Intravenous Q8H    propranoloL  30 mg Oral BID     PRN Meds:  Current Facility-Administered Medications:     acetaminophen, 650 mg, Oral, Q8H PRN    oxyCODONE, 5 mg, Oral, Q4H PRN    prochlorperazine, 5 mg, Intravenous, Q6H PRN     Review of patient's allergies indicates:  No Known Allergies  Objective:     Vital Signs (Most Recent):  Temp: 97.7 °F (36.5 °C) (04/22/24 0809)  Pulse: 63 (04/22/24 0809)  Resp: 18 (04/22/24 0809)  BP: (!) 158/90 (04/22/24 0809)  SpO2: 100 % (04/22/24 0809) Vital Signs (24h Range):  Temp:  [97.6 °F (36.4 °C)-98.2 °F (36.8 °C)] 97.7 °F (36.5 °C)  Pulse:  [63-71] 63  Resp:  [16-19] 18  SpO2:  [97 %-100 %] 100 %  BP: (126-160)/(75-90) 158/90     Weight: 78.9 kg (174 lb)  Body mass index is 31.83 kg/m².    Intake/Output - Last 3 Shifts         04/20 0700  04/21 0659 04/21 0700 04/22 0659 04/22 0700 04/23 0659    P.O. 480 480     IV Piggyback       Total Intake(mL/kg) 480 (6.1) 480 (6.1)     Net +480 +480            Urine Occurrence 5 x      Stool Occurrence  0 x              Physical Exam  Vitals and nursing note reviewed.   Constitutional:       General: She is not in acute distress.     Appearance: Normal appearance.   HENT:      Nose: Nose normal.      Mouth/Throat:      Mouth: Mucous membranes are moist.   Cardiovascular:       Rate and Rhythm: Normal rate and regular rhythm.   Pulmonary:      Effort: Pulmonary effort is normal. No respiratory distress.   Abdominal:      Comments: Abdomen soft, non-distended  Tender to palpation at the epigastric region  Previous surgical incisions well healed   Musculoskeletal:         General: Normal range of motion.   Skin:     General: Skin is warm.      Coloration: Skin is not jaundiced.   Neurological:      General: No focal deficit present.      Mental Status: She is alert.          Significant Labs:  I have reviewed all pertinent lab results within the past 24 hours.  CBC:   Recent Labs   Lab 04/22/24  0306   WBC 5.98   RBC 3.93*   HGB 12.3   HCT 38.2      MCV 97   MCH 31.3*   MCHC 32.2     CMP:   Recent Labs   Lab 04/22/24  0306   GLU 83   CALCIUM 9.0   ALBUMIN 2.7*   PROT 6.2   *   K 4.2   CO2 22*      BUN 4*   CREATININE 0.6   ALKPHOS 325*   ALT 93*   AST 66*   BILITOT 0.3       Significant Diagnostics:  I have reviewed all pertinent imaging results/findings within the past 24 hours.

## 2024-04-22 NOTE — PLAN OF CARE
"  Problem: Adult Inpatient Plan of Care  Goal: Plan of Care Review  Outcome: Ongoing, Progressing  Goal: Patient-Specific Goal (Individualized)  Outcome: Ongoing, Progressing  Goal: Absence of Hospital-Acquired Illness or Injury  Outcome: Ongoing, Progressing  Goal: Optimal Comfort and Wellbeing  Outcome: Ongoing, Progressing  Goal: Readiness for Transition of Care  Outcome: Ongoing, Progressing    [facility] Plan of Care Note    Dx:   Choledocholithiasis [K80.50]  Cholecystitis [K81.9]    Shift Events: Pt ambulated down hallways very well, c/o of pain see mar flowsheet.  Neuro: WNL  Vital Signs: BP (!) 160/90 (BP Location: Left arm, Patient Position: Lying)   Pulse 70   Temp 97.6 °F (36.4 °C) (Oral)   Resp 16   Ht 5' 2" (1.575 m)   Wt 78.9 kg (174 lb)   SpO2 100%   Breastfeeding No   BMI 31.83 kg/m²     Respiratory: WNL    Diet: Diet NPO Except for: Medication, Sips with Medication  Dietary nutrition supplements Boost Breeze - Any flavor    Is patient tolerating current diet? No     GTTS: IV Zosyn,    Urine Output/Bowel Movement:   No intake/output data recorded.  Last Bowel Movement: 04/19/24      Drains/Tubes/Tube Feeds (include total output/shift):   No intake/output data recorded.      Lines: 20g rt FA, 22g left wrist      Accuchecks: NO    Skin: no issues     Fall Risk Score: 3    Activity level? Independent     Any scheduled procedures? ERCP/    Any safety concerns? NO    Other: worried about  who was admitted to [facility] yesterday  night.    "

## 2024-04-22 NOTE — PROGRESS NOTES
[facility] - [facility]  General Surgery  Progress Note    Subjective:     History of Present Illness:  Anila Roberson is a 54yoF who is known to the surgical oncology service. She has a history significant for HTN, HLD, and Que-en-Y gastric bypass who was scheduled for outpatient cholecystectomy with joint intra-operative ERCP with Dr. Tovar. She was seen in our clinic yesterday. The patient began to experience, low grade fevers, worsening right upper quadrant abdominal pain, nausea, PO aversion. Given the worsening pain and fevers, she was instructed to present to the emergency department.     After the clinic visit yesterday, her CMP was significant for elevated ALP, AST, and ALT. Her CT scan demonstrates continued common bile duct dilation to 1cm. In the emergency department today, she continues to endorse epigastric abdominal pain and remains tender. She is not jaundiced and does not demonstrate signs of peritonitis.     She has had a previous bypass and hysterectomy. She takes no blood thinning medications.     Post-Op Info:  Procedure(s) (LRB):  ERCP (ENDOSCOPIC RETROGRADE CHOLANGIOPANCREATOGRAPHY) (N/A)         Interval History: Patient seen and examined. Remains with epigastric abdominal pain. Labs stable. Plan for OR tomorrow.     Medications:  Continuous Infusions:  Current Facility-Administered Medications   Medication Dose Route Frequency Last Rate Last Admin     Scheduled Meds:  Current Facility-Administered Medications   Medication Dose Route Frequency    amLODIPine  10 mg Oral Daily    atorvastatin  40 mg Oral QHS    enoxparin  40 mg Subcutaneous Daily    famotidine  20 mg Oral BID    FLUoxetine  80 mg Oral Daily    levothyroxine  50 mcg Oral Before breakfast    piperacillin-tazobactam (Zosyn) IV (PEDS and ADULTS) (extended infusion is not appropriate)  4.5 g Intravenous Q8H    propranoloL  30 mg Oral BID     PRN Meds:  Current Facility-Administered Medications:     acetaminophen, 650 mg, Oral, Q8H PRN     oxyCODONE, 5 mg, Oral, Q4H PRN    prochlorperazine, 5 mg, Intravenous, Q6H PRN     Review of patient's allergies indicates:  No Known Allergies  Objective:     Vital Signs (Most Recent):  Temp: 97.7 °F (36.5 °C) (04/22/24 0809)  Pulse: 63 (04/22/24 0809)  Resp: 18 (04/22/24 0809)  BP: (!) 158/90 (04/22/24 0809)  SpO2: 100 % (04/22/24 0809) Vital Signs (24h Range):  Temp:  [97.6 °F (36.4 °C)-98.2 °F (36.8 °C)] 97.7 °F (36.5 °C)  Pulse:  [63-71] 63  Resp:  [16-19] 18  SpO2:  [97 %-100 %] 100 %  BP: (126-160)/(75-90) 158/90     Weight: 78.9 kg (174 lb)  Body mass index is 31.83 kg/m².    Intake/Output - Last 3 Shifts         04/20 0700  04/21 0659 04/21 0700  04/22 0659 04/22 0700  04/23 0659    P.O. 480 480     IV Piggyback       Total Intake(mL/kg) 480 (6.1) 480 (6.1)     Net +480 +480            Urine Occurrence 5 x      Stool Occurrence  0 x              Physical Exam  Vitals and nursing note reviewed.   Constitutional:       General: She is not in acute distress.     Appearance: Normal appearance.   HENT:      Nose: Nose normal.      Mouth/Throat:      Mouth: Mucous membranes are moist.   Cardiovascular:      Rate and Rhythm: Normal rate and regular rhythm.   Pulmonary:      Effort: Pulmonary effort is normal. No respiratory distress.   Abdominal:      Comments: Abdomen soft, non-distended  Tender to palpation at the epigastric region  Previous surgical incisions well healed   Musculoskeletal:         General: Normal range of motion.   Skin:     General: Skin is warm.      Coloration: Skin is not jaundiced.   Neurological:      General: No focal deficit present.      Mental Status: She is alert.          Significant Labs:  I have reviewed all pertinent lab results within the past 24 hours.  CBC:   Recent Labs   Lab 04/22/24  0306   WBC 5.98   RBC 3.93*   HGB 12.3   HCT 38.2      MCV 97   MCH 31.3*   MCHC 32.2     CMP:   Recent Labs   Lab 04/22/24  0306   GLU 83   CALCIUM 9.0   ALBUMIN 2.7*   PROT 6.2   NA  135*   K 4.2   CO2 22*      BUN 4*   CREATININE 0.6   ALKPHOS 325*   ALT 93*   AST 66*   BILITOT 0.3       Significant Diagnostics:  I have reviewed all pertinent imaging results/findings within the past 24 hours.  Assessment/Plan:     * Choledocholithiasis  Anila Roberson is a 54yoF who is known to the surgical oncology service. She has a history of Que-en-Y gastric bypass and presents with an ascending cholangitis picture.     - patient seen and examined   - liver enzymes persistently elevated   - MRCP with filling defect of the common bile duct   - planning for lap-assisted ERCP with cholecystectomy on 4/23  - AES consulted  - continue IV antibiotics  - clear liquid diet  - pain and anti-emetics PRN          Estuardo Phillips MD  General Surgery  [facility]

## 2024-04-22 NOTE — ASSESSMENT & PLAN NOTE
Anila Roberson is a 54yoF who is known to the surgical oncology service. She has a history of Que-en-Y gastric bypass and presents with an ascending cholangitis picture.     - patient seen and examined   - liver enzymes persistently elevated   - MRCP with filling defect of the common bile duct   - planning for lap-assisted ERCP with cholecystectomy on 4/23  - AES consulted  - continue IV antibiotics  - clear liquid diet  - pain and anti-emetics PRN

## 2024-04-23 ENCOUNTER — ANESTHESIA (OUTPATIENT)
Dept: SURGERY | Facility: HOSPITAL | Age: 54
DRG: 418 | End: 2024-04-23
Payer: MEDICARE

## 2024-04-23 LAB
ALBUMIN SERPL BCP-MCNC: 2.7 G/DL (ref 3.5–5.2)
ALBUMIN SERPL BCP-MCNC: 2.9 G/DL (ref 3.5–5.2)
ALP SERPL-CCNC: 300 U/L (ref 55–135)
ALP SERPL-CCNC: 324 U/L (ref 55–135)
ALT SERPL W/O P-5'-P-CCNC: 112 U/L (ref 10–44)
ALT SERPL W/O P-5'-P-CCNC: 71 U/L (ref 10–44)
ANION GAP SERPL CALC-SCNC: 14 MMOL/L (ref 8–16)
ANION GAP SERPL CALC-SCNC: 9 MMOL/L (ref 8–16)
AST SERPL-CCNC: 152 U/L (ref 10–40)
AST SERPL-CCNC: 50 U/L (ref 10–40)
BASOPHILS # BLD AUTO: 0.02 K/UL (ref 0–0.2)
BASOPHILS # BLD AUTO: 0.02 K/UL (ref 0–0.2)
BASOPHILS NFR BLD: 0.2 % (ref 0–1.9)
BASOPHILS NFR BLD: 0.5 % (ref 0–1.9)
BILIRUB SERPL-MCNC: 0.3 MG/DL (ref 0.1–1)
BILIRUB SERPL-MCNC: 0.6 MG/DL (ref 0.1–1)
BUN SERPL-MCNC: 3 MG/DL (ref 6–20)
BUN SERPL-MCNC: 6 MG/DL (ref 6–20)
CALCIUM SERPL-MCNC: 8.4 MG/DL (ref 8.7–10.5)
CALCIUM SERPL-MCNC: 9.1 MG/DL (ref 8.7–10.5)
CHLORIDE SERPL-SCNC: 105 MMOL/L (ref 95–110)
CHLORIDE SERPL-SCNC: 106 MMOL/L (ref 95–110)
CO2 SERPL-SCNC: 20 MMOL/L (ref 23–29)
CO2 SERPL-SCNC: 25 MMOL/L (ref 23–29)
CREAT SERPL-MCNC: 0.6 MG/DL (ref 0.5–1.4)
CREAT SERPL-MCNC: 0.6 MG/DL (ref 0.5–1.4)
DIFFERENTIAL METHOD BLD: ABNORMAL
DIFFERENTIAL METHOD BLD: ABNORMAL
EOSINOPHIL # BLD AUTO: 0 K/UL (ref 0–0.5)
EOSINOPHIL # BLD AUTO: 0.1 K/UL (ref 0–0.5)
EOSINOPHIL NFR BLD: 0 % (ref 0–8)
EOSINOPHIL NFR BLD: 1.2 % (ref 0–8)
ERYTHROCYTE [DISTWIDTH] IN BLOOD BY AUTOMATED COUNT: 12.7 % (ref 11.5–14.5)
ERYTHROCYTE [DISTWIDTH] IN BLOOD BY AUTOMATED COUNT: 12.8 % (ref 11.5–14.5)
EST. GFR  (NO RACE VARIABLE): >60 ML/MIN/1.73 M^2
EST. GFR  (NO RACE VARIABLE): >60 ML/MIN/1.73 M^2
GLUCOSE SERPL-MCNC: 164 MG/DL (ref 70–110)
GLUCOSE SERPL-MCNC: 92 MG/DL (ref 70–110)
HCT VFR BLD AUTO: 39.5 % (ref 37–48.5)
HCT VFR BLD AUTO: 43.5 % (ref 37–48.5)
HGB BLD-MCNC: 12.8 G/DL (ref 12–16)
HGB BLD-MCNC: 14 G/DL (ref 12–16)
IMM GRANULOCYTES # BLD AUTO: 0.02 K/UL (ref 0–0.04)
IMM GRANULOCYTES # BLD AUTO: 0.04 K/UL (ref 0–0.04)
IMM GRANULOCYTES NFR BLD AUTO: 0.4 % (ref 0–0.5)
IMM GRANULOCYTES NFR BLD AUTO: 0.5 % (ref 0–0.5)
LYMPHOCYTES # BLD AUTO: 0.6 K/UL (ref 1–4.8)
LYMPHOCYTES # BLD AUTO: 1.1 K/UL (ref 1–4.8)
LYMPHOCYTES NFR BLD: 25.6 % (ref 18–48)
LYMPHOCYTES NFR BLD: 5 % (ref 18–48)
MAGNESIUM SERPL-MCNC: 2 MG/DL (ref 1.6–2.6)
MAGNESIUM SERPL-MCNC: 2.1 MG/DL (ref 1.6–2.6)
MCH RBC QN AUTO: 30.7 PG (ref 27–31)
MCH RBC QN AUTO: 31.1 PG (ref 27–31)
MCHC RBC AUTO-ENTMCNC: 32.2 G/DL (ref 32–36)
MCHC RBC AUTO-ENTMCNC: 32.4 G/DL (ref 32–36)
MCV RBC AUTO: 95 FL (ref 82–98)
MCV RBC AUTO: 96 FL (ref 82–98)
MONOCYTES # BLD AUTO: 0.5 K/UL (ref 0.3–1)
MONOCYTES # BLD AUTO: 0.6 K/UL (ref 0.3–1)
MONOCYTES NFR BLD: 14.6 % (ref 4–15)
MONOCYTES NFR BLD: 4.8 % (ref 4–15)
NEUTROPHILS # BLD AUTO: 10 K/UL (ref 1.8–7.7)
NEUTROPHILS # BLD AUTO: 2.4 K/UL (ref 1.8–7.7)
NEUTROPHILS NFR BLD: 57.6 % (ref 38–73)
NEUTROPHILS NFR BLD: 89.6 % (ref 38–73)
NRBC BLD-RTO: 0 /100 WBC
NRBC BLD-RTO: 0 /100 WBC
PHOSPHATE SERPL-MCNC: 3.1 MG/DL (ref 2.7–4.5)
PHOSPHATE SERPL-MCNC: 3.2 MG/DL (ref 2.7–4.5)
PLATELET # BLD AUTO: 247 K/UL (ref 150–450)
PLATELET # BLD AUTO: 389 K/UL (ref 150–450)
PMV BLD AUTO: 9.7 FL (ref 9.2–12.9)
PMV BLD AUTO: 9.9 FL (ref 9.2–12.9)
POTASSIUM SERPL-SCNC: 3.7 MMOL/L (ref 3.5–5.1)
POTASSIUM SERPL-SCNC: 3.8 MMOL/L (ref 3.5–5.1)
PROT SERPL-MCNC: 6.2 G/DL (ref 6–8.4)
PROT SERPL-MCNC: 6.9 G/DL (ref 6–8.4)
RBC # BLD AUTO: 4.12 M/UL (ref 4–5.4)
RBC # BLD AUTO: 4.56 M/UL (ref 4–5.4)
SODIUM SERPL-SCNC: 139 MMOL/L (ref 136–145)
SODIUM SERPL-SCNC: 140 MMOL/L (ref 136–145)
WBC # BLD AUTO: 11.16 K/UL (ref 3.9–12.7)
WBC # BLD AUTO: 4.1 K/UL (ref 3.9–12.7)

## 2024-04-23 PROCEDURE — 85025 COMPLETE CBC W/AUTO DIFF WBC: CPT | Mod: 91 | Performed by: STUDENT IN AN ORGANIZED HEALTH CARE EDUCATION/TRAINING PROGRAM

## 2024-04-23 PROCEDURE — 83735 ASSAY OF MAGNESIUM: CPT | Mod: 91 | Performed by: STUDENT IN AN ORGANIZED HEALTH CARE EDUCATION/TRAINING PROGRAM

## 2024-04-23 PROCEDURE — 63600175 PHARM REV CODE 636 W HCPCS: Performed by: NURSE ANESTHETIST, CERTIFIED REGISTERED

## 2024-04-23 PROCEDURE — 80053 COMPREHEN METABOLIC PANEL: CPT | Performed by: STUDENT IN AN ORGANIZED HEALTH CARE EDUCATION/TRAINING PROGRAM

## 2024-04-23 PROCEDURE — 63600175 PHARM REV CODE 636 W HCPCS: Performed by: STUDENT IN AN ORGANIZED HEALTH CARE EDUCATION/TRAINING PROGRAM

## 2024-04-23 PROCEDURE — 94761 N-INVAS EAR/PLS OXIMETRY MLT: CPT

## 2024-04-23 PROCEDURE — 25000003 PHARM REV CODE 250: Performed by: NURSE ANESTHETIST, CERTIFIED REGISTERED

## 2024-04-23 PROCEDURE — BF101ZZ FLUOROSCOPY OF BILE DUCTS USING LOW OSMOLAR CONTRAST: ICD-10-PCS | Performed by: INTERNAL MEDICINE

## 2024-04-23 PROCEDURE — 74328 X-RAY BILE DUCT ENDOSCOPY: CPT | Mod: 26,,, | Performed by: INTERNAL MEDICINE

## 2024-04-23 PROCEDURE — D9220A PRA ANESTHESIA: Mod: ANES,,, | Performed by: ANESTHESIOLOGY

## 2024-04-23 PROCEDURE — 43264 ERCP REMOVE DUCT CALCULI: CPT | Mod: ,,, | Performed by: INTERNAL MEDICINE

## 2024-04-23 PROCEDURE — 88307 TISSUE EXAM BY PATHOLOGIST: CPT | Performed by: PATHOLOGY

## 2024-04-23 PROCEDURE — 88307 TISSUE EXAM BY PATHOLOGIST: CPT | Mod: 26,,, | Performed by: PATHOLOGY

## 2024-04-23 PROCEDURE — D9220A PRA ANESTHESIA: Mod: CRNA,,, | Performed by: NURSE ANESTHETIST, CERTIFIED REGISTERED

## 2024-04-23 PROCEDURE — 63600175 PHARM REV CODE 636 W HCPCS: Mod: JZ,JG | Performed by: SURGERY

## 2024-04-23 PROCEDURE — 80053 COMPREHEN METABOLIC PANEL: CPT | Mod: 91 | Performed by: STUDENT IN AN ORGANIZED HEALTH CARE EDUCATION/TRAINING PROGRAM

## 2024-04-23 PROCEDURE — 43262 ENDO CHOLANGIOPANCREATOGRAPH: CPT | Performed by: INTERNAL MEDICINE

## 2024-04-23 PROCEDURE — 27201423 OPTIME MED/SURG SUP & DEVICES STERILE SUPPLY: Performed by: SURGERY

## 2024-04-23 PROCEDURE — 94640 AIRWAY INHALATION TREATMENT: CPT

## 2024-04-23 PROCEDURE — 36415 COLL VENOUS BLD VENIPUNCTURE: CPT | Performed by: STUDENT IN AN ORGANIZED HEALTH CARE EDUCATION/TRAINING PROGRAM

## 2024-04-23 PROCEDURE — 25000003 PHARM REV CODE 250: Performed by: STUDENT IN AN ORGANIZED HEALTH CARE EDUCATION/TRAINING PROGRAM

## 2024-04-23 PROCEDURE — C1769 GUIDE WIRE: HCPCS | Performed by: SURGERY

## 2024-04-23 PROCEDURE — 47379 UNLISTED LAPS PX LIVER: CPT | Mod: ,,, | Performed by: SURGERY

## 2024-04-23 PROCEDURE — 0FC98ZZ EXTIRPATION OF MATTER FROM COMMON BILE DUCT, VIA NATURAL OR ARTIFICIAL OPENING ENDOSCOPIC: ICD-10-PCS | Performed by: INTERNAL MEDICINE

## 2024-04-23 PROCEDURE — 25000242 PHARM REV CODE 250 ALT 637 W/ HCPCS: Performed by: STUDENT IN AN ORGANIZED HEALTH CARE EDUCATION/TRAINING PROGRAM

## 2024-04-23 PROCEDURE — 84100 ASSAY OF PHOSPHORUS: CPT | Performed by: STUDENT IN AN ORGANIZED HEALTH CARE EDUCATION/TRAINING PROGRAM

## 2024-04-23 PROCEDURE — 43262 ENDO CHOLANGIOPANCREATOGRAPH: CPT | Mod: 51,,, | Performed by: INTERNAL MEDICINE

## 2024-04-23 PROCEDURE — 0FT44ZZ RESECTION OF GALLBLADDER, PERCUTANEOUS ENDOSCOPIC APPROACH: ICD-10-PCS | Performed by: SURGERY

## 2024-04-23 PROCEDURE — 43264 ERCP REMOVE DUCT CALCULI: CPT | Performed by: INTERNAL MEDICINE

## 2024-04-23 PROCEDURE — 27202125 HC BALLOON, EXTRACTION (ANY): Performed by: SURGERY

## 2024-04-23 PROCEDURE — 36000708 HC OR TIME LEV III 1ST 15 MIN: Performed by: SURGERY

## 2024-04-23 PROCEDURE — 36000709 HC OR TIME LEV III EA ADD 15 MIN: Performed by: SURGERY

## 2024-04-23 PROCEDURE — 0F798ZZ DILATION OF COMMON BILE DUCT, VIA NATURAL OR ARTIFICIAL OPENING ENDOSCOPIC: ICD-10-PCS | Performed by: INTERNAL MEDICINE

## 2024-04-23 PROCEDURE — 25000003 PHARM REV CODE 250

## 2024-04-23 PROCEDURE — 99900035 HC TECH TIME PER 15 MIN (STAT)

## 2024-04-23 PROCEDURE — 27201674 HC SPHINCTERTOME: Performed by: SURGERY

## 2024-04-23 PROCEDURE — 63600175 PHARM REV CODE 636 W HCPCS: Mod: JZ,JG | Performed by: NURSE ANESTHETIST, CERTIFIED REGISTERED

## 2024-04-23 PROCEDURE — 0D9640Z DRAINAGE OF STOMACH WITH DRAINAGE DEVICE, PERCUTANEOUS ENDOSCOPIC APPROACH: ICD-10-PCS | Performed by: SURGERY

## 2024-04-23 PROCEDURE — 84100 ASSAY OF PHOSPHORUS: CPT | Mod: 91 | Performed by: STUDENT IN AN ORGANIZED HEALTH CARE EDUCATION/TRAINING PROGRAM

## 2024-04-23 PROCEDURE — 88331 PATH CONSLTJ SURG 1 BLK 1SPC: CPT | Mod: 26,,, | Performed by: PATHOLOGY

## 2024-04-23 PROCEDURE — 63600175 PHARM REV CODE 636 W HCPCS

## 2024-04-23 PROCEDURE — 85025 COMPLETE CBC W/AUTO DIFF WBC: CPT | Performed by: STUDENT IN AN ORGANIZED HEALTH CARE EDUCATION/TRAINING PROGRAM

## 2024-04-23 PROCEDURE — 88304 TISSUE EXAM BY PATHOLOGIST: CPT | Mod: 26,,, | Performed by: PATHOLOGY

## 2024-04-23 PROCEDURE — 20600001 HC STEP DOWN PRIVATE ROOM

## 2024-04-23 PROCEDURE — 43999 UNLISTED PROCEDURE STOMACH: CPT | Mod: ,,, | Performed by: SURGERY

## 2024-04-23 PROCEDURE — 88305 TISSUE EXAM BY PATHOLOGIST: CPT | Performed by: PATHOLOGY

## 2024-04-23 PROCEDURE — 88331 PATH CONSLTJ SURG 1 BLK 1SPC: CPT | Performed by: PATHOLOGY

## 2024-04-23 PROCEDURE — 83735 ASSAY OF MAGNESIUM: CPT | Performed by: STUDENT IN AN ORGANIZED HEALTH CARE EDUCATION/TRAINING PROGRAM

## 2024-04-23 PROCEDURE — 71000015 HC POSTOP RECOV 1ST HR: Performed by: SURGERY

## 2024-04-23 PROCEDURE — 0FB14ZX EXCISION OF RIGHT LOBE LIVER, PERCUTANEOUS ENDOSCOPIC APPROACH, DIAGNOSTIC: ICD-10-PCS | Performed by: SURGERY

## 2024-04-23 PROCEDURE — 74328 X-RAY BILE DUCT ENDOSCOPY: CPT | Mod: TC | Performed by: INTERNAL MEDICINE

## 2024-04-23 PROCEDURE — 0DC98ZZ EXTIRPATION OF MATTER FROM DUODENUM, VIA NATURAL OR ARTIFICIAL OPENING ENDOSCOPIC: ICD-10-PCS | Performed by: INTERNAL MEDICINE

## 2024-04-23 PROCEDURE — 47563 LAPARO CHOLECYSTECTOMY/GRAPH: CPT | Mod: ,,, | Performed by: SURGERY

## 2024-04-23 PROCEDURE — 37000009 HC ANESTHESIA EA ADD 15 MINS: Performed by: SURGERY

## 2024-04-23 PROCEDURE — 37000008 HC ANESTHESIA 1ST 15 MINUTES: Performed by: SURGERY

## 2024-04-23 PROCEDURE — 88304 TISSUE EXAM BY PATHOLOGIST: CPT | Performed by: PATHOLOGY

## 2024-04-23 PROCEDURE — XFJB8A7 INSPECTION OF HEPATOBILIARY DUCT USING SINGLE-USE DUODENOSCOPE, NEW TECHNOLOGY GROUP 7: ICD-10-PCS | Performed by: INTERNAL MEDICINE

## 2024-04-23 PROCEDURE — 71000033 HC RECOVERY, INTIAL HOUR: Performed by: SURGERY

## 2024-04-23 RX ORDER — PROPOFOL 10 MG/ML
VIAL (ML) INTRAVENOUS
Status: DISCONTINUED | OUTPATIENT
Start: 2024-04-23 | End: 2024-04-23

## 2024-04-23 RX ORDER — LIDOCAINE HYDROCHLORIDE 20 MG/ML
INJECTION INTRAVENOUS
Status: DISCONTINUED | OUTPATIENT
Start: 2024-04-23 | End: 2024-04-23

## 2024-04-23 RX ORDER — HYDRALAZINE HYDROCHLORIDE 20 MG/ML
INJECTION INTRAMUSCULAR; INTRAVENOUS
Status: DISCONTINUED | OUTPATIENT
Start: 2024-04-23 | End: 2024-04-23

## 2024-04-23 RX ORDER — ROCURONIUM BROMIDE 10 MG/ML
INJECTION, SOLUTION INTRAVENOUS
Status: DISCONTINUED | OUTPATIENT
Start: 2024-04-23 | End: 2024-04-23

## 2024-04-23 RX ORDER — NALOXONE HCL 0.4 MG/ML
0.02 VIAL (ML) INJECTION
Status: DISCONTINUED | OUTPATIENT
Start: 2024-04-23 | End: 2024-04-24

## 2024-04-23 RX ORDER — HYDROMORPHONE HCL IN 0.9% NACL 6 MG/30 ML
PATIENT CONTROLLED ANALGESIA SYRINGE INTRAVENOUS CONTINUOUS
Status: DISCONTINUED | OUTPATIENT
Start: 2024-04-23 | End: 2024-04-24

## 2024-04-23 RX ORDER — POTASSIUM CHLORIDE 750 MG/1
30 CAPSULE, EXTENDED RELEASE ORAL ONCE
Status: COMPLETED | OUTPATIENT
Start: 2024-04-23 | End: 2024-04-23

## 2024-04-23 RX ORDER — PROCHLORPERAZINE EDISYLATE 5 MG/ML
5 INJECTION INTRAMUSCULAR; INTRAVENOUS EVERY 30 MIN PRN
Status: DISCONTINUED | OUTPATIENT
Start: 2024-04-23 | End: 2024-04-23

## 2024-04-23 RX ORDER — BUPIVACAINE HYDROCHLORIDE 2.5 MG/ML
INJECTION, SOLUTION EPIDURAL; INFILTRATION; INTRACAUDAL
Status: DISCONTINUED | OUTPATIENT
Start: 2024-04-23 | End: 2024-04-23 | Stop reason: HOSPADM

## 2024-04-23 RX ORDER — DEXMEDETOMIDINE HYDROCHLORIDE 100 UG/ML
INJECTION, SOLUTION INTRAVENOUS
Status: DISCONTINUED | OUTPATIENT
Start: 2024-04-23 | End: 2024-04-23

## 2024-04-23 RX ORDER — MIDAZOLAM HYDROCHLORIDE 1 MG/ML
INJECTION INTRAMUSCULAR; INTRAVENOUS
Status: DISCONTINUED | OUTPATIENT
Start: 2024-04-23 | End: 2024-04-23

## 2024-04-23 RX ORDER — HYDROMORPHONE HYDROCHLORIDE 1 MG/ML
0.2 INJECTION, SOLUTION INTRAMUSCULAR; INTRAVENOUS; SUBCUTANEOUS EVERY 5 MIN PRN
Status: DISCONTINUED | OUTPATIENT
Start: 2024-04-23 | End: 2024-04-23

## 2024-04-23 RX ORDER — ONDANSETRON HYDROCHLORIDE 2 MG/ML
INJECTION, SOLUTION INTRAVENOUS
Status: DISCONTINUED | OUTPATIENT
Start: 2024-04-23 | End: 2024-04-23

## 2024-04-23 RX ORDER — FENTANYL CITRATE 50 UG/ML
INJECTION, SOLUTION INTRAMUSCULAR; INTRAVENOUS
Status: DISCONTINUED | OUTPATIENT
Start: 2024-04-23 | End: 2024-04-23

## 2024-04-23 RX ORDER — DEXAMETHASONE SODIUM PHOSPHATE 4 MG/ML
INJECTION, SOLUTION INTRA-ARTICULAR; INTRALESIONAL; INTRAMUSCULAR; INTRAVENOUS; SOFT TISSUE
Status: DISCONTINUED | OUTPATIENT
Start: 2024-04-23 | End: 2024-04-23

## 2024-04-23 RX ORDER — CEFAZOLIN SODIUM 1 G/3ML
INJECTION, POWDER, FOR SOLUTION INTRAMUSCULAR; INTRAVENOUS
Status: DISCONTINUED | OUTPATIENT
Start: 2024-04-23 | End: 2024-04-23

## 2024-04-23 RX ORDER — ACETAMINOPHEN 500 MG
1000 TABLET ORAL
Status: COMPLETED | OUTPATIENT
Start: 2024-04-23 | End: 2024-04-23

## 2024-04-23 RX ORDER — LEVALBUTEROL INHALATION SOLUTION 0.63 MG/3ML
0.63 SOLUTION RESPIRATORY (INHALATION)
Status: COMPLETED | OUTPATIENT
Start: 2024-04-23 | End: 2024-04-25

## 2024-04-23 RX ADMIN — Medication 1 EACH: at 02:04

## 2024-04-23 RX ADMIN — FENTANYL CITRATE 50 MCG: 50 INJECTION, SOLUTION INTRAMUSCULAR; INTRAVENOUS at 01:04

## 2024-04-23 RX ADMIN — ROCURONIUM BROMIDE 50 MG: 10 INJECTION, SOLUTION INTRAVENOUS at 01:04

## 2024-04-23 RX ADMIN — PROPRANOLOL HYDROCHLORIDE 30 MG: 20 TABLET ORAL at 09:04

## 2024-04-23 RX ADMIN — HYDROMORPHONE HYDROCHLORIDE 0.2 MG: 1 INJECTION, SOLUTION INTRAMUSCULAR; INTRAVENOUS; SUBCUTANEOUS at 08:04

## 2024-04-23 RX ADMIN — FAMOTIDINE 20 MG: 20 TABLET ORAL at 08:04

## 2024-04-23 RX ADMIN — DEXMEDETOMIDINE 8 MCG: 100 INJECTION, SOLUTION, CONCENTRATE INTRAVENOUS at 07:04

## 2024-04-23 RX ADMIN — DEXAMETHASONE SODIUM PHOSPHATE 8 MG: 4 INJECTION, SOLUTION INTRAMUSCULAR; INTRAVENOUS at 01:04

## 2024-04-23 RX ADMIN — PIPERACILLIN SODIUM AND TAZOBACTAM SODIUM 4.5 G: 4; .5 INJECTION, POWDER, FOR SOLUTION INTRAVENOUS at 12:04

## 2024-04-23 RX ADMIN — HYDRALAZINE HYDROCHLORIDE 5 MG: 20 INJECTION INTRAMUSCULAR; INTRAVENOUS at 04:04

## 2024-04-23 RX ADMIN — ROCURONIUM BROMIDE 20 MG: 10 INJECTION, SOLUTION INTRAVENOUS at 05:04

## 2024-04-23 RX ADMIN — PROPRANOLOL HYDROCHLORIDE 30 MG: 20 TABLET ORAL at 08:04

## 2024-04-23 RX ADMIN — PIPERACILLIN SODIUM AND TAZOBACTAM SODIUM 4.5 G: 4; .5 INJECTION, POWDER, FOR SOLUTION INTRAVENOUS at 11:04

## 2024-04-23 RX ADMIN — FENTANYL CITRATE 50 MCG: 50 INJECTION, SOLUTION INTRAMUSCULAR; INTRAVENOUS at 02:04

## 2024-04-23 RX ADMIN — PIPERACILLIN SODIUM AND TAZOBACTAM SODIUM 4.5 G: 4; .5 INJECTION, POWDER, FOR SOLUTION INTRAVENOUS at 08:04

## 2024-04-23 RX ADMIN — MIDAZOLAM HYDROCHLORIDE 2 MG: 2 INJECTION, SOLUTION INTRAMUSCULAR; INTRAVENOUS at 01:04

## 2024-04-23 RX ADMIN — FENTANYL CITRATE 50 MCG: 50 INJECTION, SOLUTION INTRAMUSCULAR; INTRAVENOUS at 04:04

## 2024-04-23 RX ADMIN — Medication: at 08:04

## 2024-04-23 RX ADMIN — SODIUM CHLORIDE: 0.9 INJECTION, SOLUTION INTRAVENOUS at 01:04

## 2024-04-23 RX ADMIN — PROCHLORPERAZINE EDISYLATE 5 MG: 5 INJECTION INTRAMUSCULAR; INTRAVENOUS at 06:04

## 2024-04-23 RX ADMIN — ROCURONIUM BROMIDE 20 MG: 10 INJECTION, SOLUTION INTRAVENOUS at 06:04

## 2024-04-23 RX ADMIN — PROPOFOL 150 MG: 10 INJECTION, EMULSION INTRAVENOUS at 01:04

## 2024-04-23 RX ADMIN — POTASSIUM CHLORIDE 30 MEQ: 10 CAPSULE, COATED, EXTENDED RELEASE ORAL at 08:04

## 2024-04-23 RX ADMIN — ACETAMINOPHEN 1000 MG: 500 TABLET ORAL at 12:04

## 2024-04-23 RX ADMIN — ATORVASTATIN CALCIUM 40 MG: 40 TABLET, FILM COATED ORAL at 09:04

## 2024-04-23 RX ADMIN — LIDOCAINE HYDROCHLORIDE 80 MG: 20 INJECTION INTRAVENOUS at 01:04

## 2024-04-23 RX ADMIN — FLUOXETINE HYDROCHLORIDE 80 MG: 20 CAPSULE ORAL at 08:04

## 2024-04-23 RX ADMIN — ONDANSETRON 4 MG: 2 INJECTION INTRAMUSCULAR; INTRAVENOUS at 06:04

## 2024-04-23 RX ADMIN — CEFAZOLIN 2 G: 330 INJECTION, POWDER, FOR SOLUTION INTRAMUSCULAR; INTRAVENOUS at 01:04

## 2024-04-23 RX ADMIN — ROCURONIUM BROMIDE 20 MG: 10 INJECTION, SOLUTION INTRAVENOUS at 02:04

## 2024-04-23 RX ADMIN — SUGAMMADEX 200 MG: 100 INJECTION, SOLUTION INTRAVENOUS at 08:04

## 2024-04-23 RX ADMIN — HYDRALAZINE HYDROCHLORIDE 5 MG: 20 INJECTION INTRAMUSCULAR; INTRAVENOUS at 06:04

## 2024-04-23 RX ADMIN — FENTANYL CITRATE 50 MCG: 50 INJECTION, SOLUTION INTRAMUSCULAR; INTRAVENOUS at 08:04

## 2024-04-23 RX ADMIN — ROCURONIUM BROMIDE 20 MG: 10 INJECTION, SOLUTION INTRAVENOUS at 03:04

## 2024-04-23 RX ADMIN — SODIUM CHLORIDE, SODIUM GLUCONATE, SODIUM ACETATE, POTASSIUM CHLORIDE, MAGNESIUM CHLORIDE, SODIUM PHOSPHATE, DIBASIC, AND POTASSIUM PHOSPHATE: .53; .5; .37; .037; .03; .012; .00082 INJECTION, SOLUTION INTRAVENOUS at 02:04

## 2024-04-23 RX ADMIN — PIPERACILLIN SODIUM AND TAZOBACTAM SODIUM 4.5 G: 4; .5 INJECTION, POWDER, FOR SOLUTION INTRAVENOUS at 04:04

## 2024-04-23 RX ADMIN — FAMOTIDINE 20 MG: 20 TABLET ORAL at 09:04

## 2024-04-23 RX ADMIN — OXYCODONE 5 MG: 5 TABLET ORAL at 05:04

## 2024-04-23 RX ADMIN — METHOCARBAMOL 1000 MG: 100 INJECTION, SOLUTION INTRAMUSCULAR; INTRAVENOUS at 10:04

## 2024-04-23 RX ADMIN — LEVALBUTEROL HYDROCHLORIDE 0.63 MG: 0.63 SOLUTION RESPIRATORY (INHALATION) at 08:04

## 2024-04-23 RX ADMIN — AMLODIPINE BESYLATE 10 MG: 10 TABLET ORAL at 08:04

## 2024-04-23 RX ADMIN — OXYCODONE 5 MG: 5 TABLET ORAL at 12:04

## 2024-04-23 RX ADMIN — OXYCODONE 5 MG: 5 TABLET ORAL at 11:04

## 2024-04-23 RX ADMIN — PROCHLORPERAZINE EDISYLATE 5 MG: 5 INJECTION INTRAMUSCULAR; INTRAVENOUS at 09:04

## 2024-04-23 RX ADMIN — FENTANYL CITRATE 50 MCG: 50 INJECTION, SOLUTION INTRAMUSCULAR; INTRAVENOUS at 03:04

## 2024-04-23 RX ADMIN — ROCURONIUM BROMIDE 20 MG: 10 INJECTION, SOLUTION INTRAVENOUS at 04:04

## 2024-04-23 NOTE — PROGRESS NOTES
[facility] - [facility]  General Surgery  Progress Note    Subjective:     History of Present Illness:  Anila Roberson is a 54yoF who is known to the surgical oncology service. She has a history significant for HTN, HLD, and Que-en-Y gastric bypass who was scheduled for outpatient cholecystectomy with joint intra-operative ERCP with Dr. Tovar. She was seen in our clinic yesterday. The patient began to experience, low grade fevers, worsening right upper quadrant abdominal pain, nausea, PO aversion. Given the worsening pain and fevers, she was instructed to present to the emergency department.     After the clinic visit yesterday, her CMP was significant for elevated ALP, AST, and ALT. Her CT scan demonstrates continued common bile duct dilation to 1cm. In the emergency department today, she continues to endorse epigastric abdominal pain and remains tender. She is not jaundiced and does not demonstrate signs of peritonitis.     She has had a previous bypass and hysterectomy. She takes no blood thinning medications.     Post-Op Info:  Procedure(s) (LRB):  CHOLECYSTECTOMY, LAPAROSCOPIC, WITH CHOLANGIOGRAM (N/A)  ERCP (ENDOSCOPIC RETROGRADE CHOLANGIOPANCREATOGRAPHY) (N/A)   Day of Surgery     Interval History: NAEON. Prepared for OR today, risks/benefits/procedure details discussed.    Medications:  Continuous Infusions:  Current Facility-Administered Medications   Medication Dose Route Frequency Last Rate Last Admin     Scheduled Meds:  Current Facility-Administered Medications   Medication Dose Route Frequency    amLODIPine  10 mg Oral Daily    atorvastatin  40 mg Oral QHS    enoxparin  40 mg Subcutaneous Daily    famotidine  20 mg Oral BID    FLUoxetine  80 mg Oral Daily    levothyroxine  50 mcg Oral Before breakfast    piperacillin-tazobactam (Zosyn) IV (PEDS and ADULTS) (extended infusion is not appropriate)  4.5 g Intravenous Q8H    propranoloL  30 mg Oral BID     PRN Meds:  Current Facility-Administered Medications:      acetaminophen, 650 mg, Oral, Q8H PRN    oxyCODONE, 5 mg, Oral, Q4H PRN    prochlorperazine, 5 mg, Intravenous, Q6H PRN     Review of patient's allergies indicates:  No Known Allergies  Objective:     Vital Signs (Most Recent):  Temp: 97.7 °F (36.5 °C) (04/23/24 0427)  Pulse: 63 (04/23/24 0427)  Resp: 18 (04/23/24 0550)  BP: (!) 150/80 (04/23/24 0427)  SpO2: 99 % (04/23/24 0427) Vital Signs (24h Range):  Temp:  [97.5 °F (36.4 °C)-97.8 °F (36.6 °C)] 97.7 °F (36.5 °C)  Pulse:  [58-68] 63  Resp:  [16-18] 18  SpO2:  [94 %-100 %] 99 %  BP: (138-164)/(65-90) 150/80     Weight: 78.9 kg (174 lb)  Body mass index is 31.83 kg/m².    Intake/Output - Last 3 Shifts         04/21 0700  04/22 0659 04/22 0700  04/23 0659    P.O. 480     Total Intake(mL/kg) 480 (6.1)     Net +480           Stool Occurrence 0 x              Physical Exam  Vitals and nursing note reviewed.   Constitutional:       General: She is not in acute distress.     Appearance: Normal appearance.   HENT:      Nose: Nose normal.      Mouth/Throat:      Mouth: Mucous membranes are moist.   Cardiovascular:      Rate and Rhythm: Normal rate and regular rhythm.   Pulmonary:      Effort: Pulmonary effort is normal. No respiratory distress.   Abdominal:      Comments: Abdomen soft, non-distended  Tender to palpation at the epigastric region  Previous surgical incisions well healed   Musculoskeletal:         General: Normal range of motion.   Skin:     General: Skin is warm.      Coloration: Skin is not jaundiced.   Neurological:      General: No focal deficit present.      Mental Status: She is alert.          Significant Labs:  I have reviewed all pertinent lab results within the past 24 hours.  CBC:   Recent Labs   Lab 04/23/24  0326   WBC 4.10   RBC 4.12   HGB 12.8   HCT 39.5      MCV 96   MCH 31.1*   MCHC 32.4     CMP:   Recent Labs   Lab 04/23/24  0326   GLU 92   CALCIUM 9.1   ALBUMIN 2.7*   PROT 6.2      K 3.7   CO2 25      BUN 3*   CREATININE  0.6   ALKPHOS 300*   ALT 71*   AST 50*   BILITOT 0.3       Significant Diagnostics:  I have reviewed all pertinent imaging results/findings within the past 24 hours.  Assessment/Plan:     * Choledocholithiasis  Anila Roberson is a 54yoF who is known to the surgical oncology service. She has a history of Que-en-Y gastric bypass and presents with an ascending cholangitis picture.     - patient seen and examined   - liver enzymes persistently elevated   - MRCP with filling defect of the common bile duct   - OR for lap-assisted ERCP with cholecystectomy on 4/23    - Consented  - AES consulted  - continue IV antibiotics  - clear liquid diet  - pain and anti-emetics PRN          Yuliet Villegas MD  General Surgery  [facility]

## 2024-04-23 NOTE — PROVATION PATIENT INSTRUCTIONS
Discharge Summary/Instructions after an Endoscopic Procedure  Patient Name: Anila Roberson  Patient MRN: [id number]  Patient YOB: 1970 Tuesday, April 23, 2024  Lu Mccormack MD  Dear patient,  As a result of recent federal legislation (The Federal Cures Act), you may   receive lab or pathology results from your procedure in your [facility]   account before your physician is able to contact you. Your physician or   their representative will relay the results to you with their   recommendations at their soonest availability.  Thank you,  RESTRICTIONS:  During your procedure today, you received medications for sedation.  These   medications may affect your judgment, balance and coordination.  Therefore,   for 24 hours, you have the following restrictions:   - DO NOT drive a car, operate machinery, make legal/financial decisions,   sign important papers or drink alcohol.    ACTIVITY:  Today: no heavy lifting, straining or running due to procedural   sedation/anesthesia.  The following day: return to full activity including work.  DIET:  Eat and drink normally unless instructed otherwise.     TREATMENT FOR COMMON SIDE EFFECTS:  - Mild abdominal pain, nausea, belching, bloating or excessive gas:  rest,   eat lightly and use a heating pad.  - Sore Throat: treat with throat lozenges and/or gargle with warm salt   water.  - Because air was used during the procedure, expelling large amounts of air   from your rectum or belching is normal.  - If a bowel prep was taken, you may not have a bowel movement for 1-3 days.    This is normal.  SYMPTOMS TO WATCH FOR AND REPORT TO YOUR PHYSICIAN:  1. Abdominal pain or bloating, other than gas cramps.  2. Chest pain.  3. Back pain.  4. Signs of infection such as: chills or fever occurring within 24 hours   after the procedure.  5. Rectal bleeding, which would show as bright red, maroon, or black stools.   (A tablespoon of blood from the rectum is not serious, especially if    hemorrhoids are present.)  6. Vomiting.  7. Weakness or dizziness.  GO DIRECTLY TO THE NEAREST EMERGENCY ROOM IF YOU HAVE ANY OF THE FOLLOWING:      Difficulty breathing              Chills and/or fever over 101 F   Persistent vomiting and/or vomiting blood   Severe abdominal pain   Severe chest pain   Black, tarry stools   Bleeding- more than one tablespoon   Any other symptom or condition that you feel may need urgent attention  Your doctor recommends these additional instructions:  If any biopsies were taken, your doctors clinic will contact you in 1 to 2   weeks with any results.  - Antibiotics as per surgical service.  - Return patient to hospital [facility] for ongoing care.   - Diet recommendations per surgery service.   - The patient should not require a repeat ERCP.   - Return to referring physician.   - Watch for pancreatitis, bleeding, perforation, and cholangitis.  For questions, problems or results please call your physician - Lu Mccormack MD at Work:  [phone].  [facility], EMERGENCY ROOM PHONE NUMBER: [phone]  IF A COMPLICATION OR EMERGENCY SITUATION ARISES AND YOU ARE UNABLE TO REACH   YOUR PHYSICIAN - GO DIRECTLY TO THE EMERGENCY ROOM.  Lu Mccormack MD  4/23/2024 6:53:01 PM  This report has been verified and signed electronically.  Dear patient,  As a result of recent federal legislation (The Federal Cures Act), you may   receive lab or pathology results from your procedure in your [facility]   account before your physician is able to contact you. Your physician or   their representative will relay the results to you with their   recommendations at their soonest availability.  Thank you,  PROVATION

## 2024-04-23 NOTE — H&P
Short Stay Endoscopy History and Physical    PCP - No, Primary Doctor  Referring Physician - Self, Aaareferral  No address on file    Procedure - lap assisted intraoperative ERCP  ASA - per anesthesia  Mallampati - per anesthesia  History of Anesthesia problems - per anesthesia  Family history Anesthesia problems -  per anesthesia   Plan of anesthesia - per anesthesia    HPI:  This is a 54 y.o. female here for evaluation of: lap assisted intraoperative ERCP during cholecystectomy for choledocholithiasis.    Reflux - no  Dysphagia - no  Abdominal pain - yes  Diarrhea - no    ROS:  Constitutional: No fevers, chills, No weight loss  CV: No chest pain  Pulm: No cough, No shortness of breath  Ophtho: No vision changes  GI: see HPI  Derm: No rash    Medical History:  has a past medical history of Arthritis, Atrial fibrillation, GERD (gastroesophageal reflux disease), Hyperlipidemia, Hypertension, Hypothyroidism, Migraine headache, and Sleep apnea, unspecified.    Surgical History:  has a past surgical history that includes Gastric bypass (2002); Spine surgery; Cervical fusion; and Cardiac electrophysiology study and ablation (2022).    Family History: family history includes Alcohol abuse in her mother; Heart disease in her father and mother..    Social History:  reports that she has quit smoking. Her smoking use included cigarettes. She has never used smokeless tobacco. She reports current alcohol use of about 1.0 standard drink of alcohol per week. She reports current drug use. Drug: Marijuana.    Review of patient's allergies indicates:  No Known Allergies    Medications:   Medications Prior to Admission   Medication Sig Dispense Refill Last Dose    amLODIPine (NORVASC) 10 MG tablet Take 10 mg by mouth once daily.       atorvastatin (LIPITOR) 80 MG tablet Take 40 mg by mouth every evening.       BANOPHEN 25 mg capsule Take 25 mg by mouth nightly as needed.       butalbital-acetaminophen-caffeine -40 mg  (FIORICET, ESGIC) -40 mg per tablet Take 1 tablet by mouth 2 (two) times daily as needed for Headaches.       estradioL (ESTRACE) 1 MG tablet Take 1 tablet by mouth every evening.       famotidine (PEPCID) 20 MG tablet Take 20 mg by mouth 2 (two) times daily.       FLUoxetine 20 MG capsule Take 80 mg by mouth once daily.       [] HYDROcodone-acetaminophen (NORCO)  mg per tablet Take 1 tablet by mouth every 6 (six) hours as needed for Pain.       levothyroxine (SYNTHROID) 50 MCG tablet Take 50 mcg by mouth before breakfast.       magnesium oxide 420 mg Tab Take 420 mg by mouth once daily.       MYRBETRIQ 50 mg Tb24 Take 1 tablet by mouth every morning.       promethazine (PHENERGAN) 25 MG tablet Take 1 tablet by mouth every 6 (six) hours as needed for Nausea.       propranoloL (INDERAL) 10 MG tablet Take 30 mg by mouth 2 (two) times daily.       tiZANidine (ZANAFLEX) 2 MG tablet Take 2 mg by mouth every 8 (eight) hours as needed.       tolterodine (DETROL LA) 4 MG 24 hr capsule Take 4 mg by mouth every evening.       topiramate (TOPAMAX) 100 MG tablet Take 100 mg by mouth 2 (two) times daily.       zinc sulfate (ZINCATE) 50 mg zinc (220 mg) capsule Take 50 mg by mouth once daily.          Physical Exam:    Vital Signs:   Vitals:    24 1118   BP:    Pulse:    Resp: 18   Temp:        General Appearance: Well appearing in no acute distress    Labs:  Lab Results   Component Value Date    WBC 4.10 2024    HGB 12.8 2024    HCT 39.5 2024     2024    ALT 71 (H) 2024    AST 50 (H) 2024     2024    K 3.7 2024     2024    CREATININE 0.6 2024    BUN 3 (L) 2024    CO2 25 2024    INR 1.0 2024       I have explained the risks and benefits of this endoscopic procedure to the patient including but not limited to bleeding, inflammation, infection, perforation, pancreatitis, missing a lesion and death.      Neej J.  MD [place]

## 2024-04-23 NOTE — ASSESSMENT & PLAN NOTE
Anila Roberson is a 54yoF who is known to the surgical oncology service. She has a history of Que-en-Y gastric bypass and presents with an ascending cholangitis picture.     - patient seen and examined   - liver enzymes persistently elevated   - MRCP with filling defect of the common bile duct   - OR for lap-assisted ERCP with cholecystectomy on 4/23    - Consented  - AES consulted  - continue IV antibiotics  - clear liquid diet  - pain and anti-emetics PRN

## 2024-04-23 NOTE — PLAN OF CARE
"  Problem: Adult Inpatient Plan of Care  Goal: Plan of Care Review  Outcome: Progressing  Goal: Patient-Specific Goal (Individualized)  Outcome: Progressing  Goal: Absence of Hospital-Acquired Illness or Injury  Outcome: Progressing  Goal: Optimal Comfort and Wellbeing  Outcome: Progressing  Goal: Readiness for Transition of Care  Outcome: Progressing         [facility] Plan of Care Note    Dx:   Choledocholithiasis [K80.50]  Cholecystitis [K81.9]    Shift Events: Pt ambulated down hallways very well, c/o of pain see mar flowsheet.  Neuro: WNL  Vital Signs: BP (!) 150/80 (BP Location: Left arm, Patient Position: Lying)   Pulse 63   Temp 97.7 °F (36.5 °C) (Oral)   Resp 18   Ht 5' 2" (1.575 m)   Wt 78.9 kg (174 lb)   SpO2 99%   Breastfeeding No   BMI 31.83 kg/m²     Respiratory: WNL    Diet: Diet NPO Except for: Medication, Sips with Medication  Dietary nutrition supplements Boost Breeze - Any flavor    Is patient tolerating current diet? No     GTTS: IV Zosyn,    Urine Output/Bowel Movement:   No intake/output data recorded.  Last Bowel Movement: 04/19/24  Drains/Tubes/Tube Feeds (include total output/shift):   No intake/output data recorded.  Lines: 20g rt FA, 22g left wrist  Accuchecks: NO  Skin: no issues   Fall Risk Score: 3  Activity level? Independent   Any scheduled procedures? ERCP/remove gallstones   Any safety concerns? NO  Other: worried about  who was admitted to [facility] yesterday  night.    "

## 2024-04-23 NOTE — ANESTHESIA PROCEDURE NOTES
Intubation    Date/Time: 4/23/2024 1:27 PM    Performed by: John Anderson CRNA  Authorized by: Lyndon Josue Jr., MD    Intubation:     Induction:  Intravenous    Intubated:  Postinduction    Mask Ventilation:  Easy mask    Attempts:  1    Attempted By:  CRNA    Method of Intubation:  Video laryngoscopy    Blade:  Hoover 3    Laryngeal View Grade: Grade I - full view of cords      Difficult Airway Encountered?: No      Complications:  None    Airway Device:  Oral endotracheal tube    Airway Device Size:  7.0    Style/Cuff Inflation:  Cuffed    Inflation Amount (mL):  3    Tube secured:  24    Secured at:  The lips    Placement Verified By:  Capnometry    Complicating Factors:  Poor neck/head extension (positioned to comfortable extension prior to induction)    Findings Post-Intubation:  BS equal bilateral and atraumatic/condition of teeth unchanged

## 2024-04-23 NOTE — SUBJECTIVE & OBJECTIVE
Interval History: NAEON. Prepared for OR today, risks/benefits/procedure details discussed.    Medications:  Continuous Infusions:  Current Facility-Administered Medications   Medication Dose Route Frequency Last Rate Last Admin     Scheduled Meds:  Current Facility-Administered Medications   Medication Dose Route Frequency    amLODIPine  10 mg Oral Daily    atorvastatin  40 mg Oral QHS    enoxparin  40 mg Subcutaneous Daily    famotidine  20 mg Oral BID    FLUoxetine  80 mg Oral Daily    levothyroxine  50 mcg Oral Before breakfast    piperacillin-tazobactam (Zosyn) IV (PEDS and ADULTS) (extended infusion is not appropriate)  4.5 g Intravenous Q8H    propranoloL  30 mg Oral BID     PRN Meds:  Current Facility-Administered Medications:     acetaminophen, 650 mg, Oral, Q8H PRN    oxyCODONE, 5 mg, Oral, Q4H PRN    prochlorperazine, 5 mg, Intravenous, Q6H PRN     Review of patient's allergies indicates:  No Known Allergies  Objective:     Vital Signs (Most Recent):  Temp: 97.7 °F (36.5 °C) (04/23/24 0427)  Pulse: 63 (04/23/24 0427)  Resp: 18 (04/23/24 0550)  BP: (!) 150/80 (04/23/24 0427)  SpO2: 99 % (04/23/24 0427) Vital Signs (24h Range):  Temp:  [97.5 °F (36.4 °C)-97.8 °F (36.6 °C)] 97.7 °F (36.5 °C)  Pulse:  [58-68] 63  Resp:  [16-18] 18  SpO2:  [94 %-100 %] 99 %  BP: (138-164)/(65-90) 150/80     Weight: 78.9 kg (174 lb)  Body mass index is 31.83 kg/m².    Intake/Output - Last 3 Shifts         04/21 0700  04/22 0659 04/22 0700  04/23 0659    P.O. 480     Total Intake(mL/kg) 480 (6.1)     Net +480           Stool Occurrence 0 x              Physical Exam  Vitals and nursing note reviewed.   Constitutional:       General: She is not in acute distress.     Appearance: Normal appearance.   HENT:      Nose: Nose normal.      Mouth/Throat:      Mouth: Mucous membranes are moist.   Cardiovascular:      Rate and Rhythm: Normal rate and regular rhythm.   Pulmonary:      Effort: Pulmonary effort is normal. No respiratory  distress.   Abdominal:      Comments: Abdomen soft, non-distended  Tender to palpation at the epigastric region  Previous surgical incisions well healed   Musculoskeletal:         General: Normal range of motion.   Skin:     General: Skin is warm.      Coloration: Skin is not jaundiced.   Neurological:      General: No focal deficit present.      Mental Status: She is alert.          Significant Labs:  I have reviewed all pertinent lab results within the past 24 hours.  CBC:   Recent Labs   Lab 04/23/24  0326   WBC 4.10   RBC 4.12   HGB 12.8   HCT 39.5      MCV 96   MCH 31.1*   MCHC 32.4     CMP:   Recent Labs   Lab 04/23/24  0326   GLU 92   CALCIUM 9.1   ALBUMIN 2.7*   PROT 6.2      K 3.7   CO2 25      BUN 3*   CREATININE 0.6   ALKPHOS 300*   ALT 71*   AST 50*   BILITOT 0.3       Significant Diagnostics:  I have reviewed all pertinent imaging results/findings within the past 24 hours.

## 2024-04-24 PROBLEM — E83.39 HYPOPHOSPHATEMIA: Status: ACTIVE | Noted: 2024-04-24

## 2024-04-24 PROBLEM — E78.5 HLD (HYPERLIPIDEMIA): Status: ACTIVE | Noted: 2024-04-24

## 2024-04-24 PROBLEM — I10 HTN (HYPERTENSION): Status: ACTIVE | Noted: 2024-04-24

## 2024-04-24 PROBLEM — E03.9 HYPOTHYROID: Status: ACTIVE | Noted: 2024-04-24

## 2024-04-24 PROBLEM — E66.9 OBESITY (BMI 30-39.9): Status: ACTIVE | Noted: 2024-04-24

## 2024-04-24 PROBLEM — K21.9 GERD (GASTROESOPHAGEAL REFLUX DISEASE): Status: ACTIVE | Noted: 2024-04-24

## 2024-04-24 PROBLEM — E87.1 HYPONATREMIA: Status: ACTIVE | Noted: 2024-04-24

## 2024-04-24 LAB
ALBUMIN SERPL BCP-MCNC: 2.8 G/DL (ref 3.5–5.2)
ALP SERPL-CCNC: 414 U/L (ref 55–135)
ALT SERPL W/O P-5'-P-CCNC: 222 U/L (ref 10–44)
ANION GAP SERPL CALC-SCNC: 12 MMOL/L (ref 8–16)
ANISOCYTOSIS BLD QL SMEAR: SLIGHT
AST SERPL-CCNC: 352 U/L (ref 10–40)
BASOPHILS # BLD AUTO: ABNORMAL K/UL (ref 0–0.2)
BASOPHILS NFR BLD: 0 % (ref 0–1.9)
BILIRUB SERPL-MCNC: 0.5 MG/DL (ref 0.1–1)
BUN SERPL-MCNC: 7 MG/DL (ref 6–20)
CALCIUM SERPL-MCNC: 9.1 MG/DL (ref 8.7–10.5)
CHLORIDE SERPL-SCNC: 103 MMOL/L (ref 95–110)
CO2 SERPL-SCNC: 23 MMOL/L (ref 23–29)
CREAT SERPL-MCNC: 0.6 MG/DL (ref 0.5–1.4)
DIFFERENTIAL METHOD BLD: ABNORMAL
EOSINOPHIL # BLD AUTO: ABNORMAL K/UL (ref 0–0.5)
EOSINOPHIL NFR BLD: 0 % (ref 0–8)
ERYTHROCYTE [DISTWIDTH] IN BLOOD BY AUTOMATED COUNT: 12.7 % (ref 11.5–14.5)
EST. GFR  (NO RACE VARIABLE): >60 ML/MIN/1.73 M^2
GLUCOSE SERPL-MCNC: 143 MG/DL (ref 70–110)
HCT VFR BLD AUTO: 38.8 % (ref 37–48.5)
HGB BLD-MCNC: 12.7 G/DL (ref 12–16)
HYPOCHROMIA BLD QL SMEAR: ABNORMAL
IMM GRANULOCYTES # BLD AUTO: ABNORMAL K/UL (ref 0–0.04)
IMM GRANULOCYTES NFR BLD AUTO: ABNORMAL % (ref 0–0.5)
LIPASE SERPL-CCNC: 28 U/L (ref 4–60)
LYMPHOCYTES # BLD AUTO: ABNORMAL K/UL (ref 1–4.8)
LYMPHOCYTES NFR BLD: 2 % (ref 18–48)
MAGNESIUM SERPL-MCNC: 2 MG/DL (ref 1.6–2.6)
MCH RBC QN AUTO: 31.4 PG (ref 27–31)
MCHC RBC AUTO-ENTMCNC: 32.7 G/DL (ref 32–36)
MCV RBC AUTO: 96 FL (ref 82–98)
MONOCYTES # BLD AUTO: ABNORMAL K/UL (ref 0.3–1)
MONOCYTES NFR BLD: 5 % (ref 4–15)
NEUTROPHILS NFR BLD: 83 % (ref 38–73)
NEUTS BAND NFR BLD MANUAL: 10 %
NRBC BLD-RTO: 0 /100 WBC
OVALOCYTES BLD QL SMEAR: ABNORMAL
PHOSPHATE SERPL-MCNC: 2.5 MG/DL (ref 2.7–4.5)
PLATELET # BLD AUTO: 377 K/UL (ref 150–450)
PMV BLD AUTO: 10.4 FL (ref 9.2–12.9)
POIKILOCYTOSIS BLD QL SMEAR: SLIGHT
POLYCHROMASIA BLD QL SMEAR: ABNORMAL
POTASSIUM SERPL-SCNC: 3.7 MMOL/L (ref 3.5–5.1)
PROT SERPL-MCNC: 6.7 G/DL (ref 6–8.4)
RBC # BLD AUTO: 4.04 M/UL (ref 4–5.4)
SODIUM SERPL-SCNC: 138 MMOL/L (ref 136–145)
SPHEROCYTES BLD QL SMEAR: ABNORMAL
WBC # BLD AUTO: 19 K/UL (ref 3.9–12.7)

## 2024-04-24 PROCEDURE — 36415 COLL VENOUS BLD VENIPUNCTURE: CPT | Performed by: STUDENT IN AN ORGANIZED HEALTH CARE EDUCATION/TRAINING PROGRAM

## 2024-04-24 PROCEDURE — 85007 BL SMEAR W/DIFF WBC COUNT: CPT | Performed by: STUDENT IN AN ORGANIZED HEALTH CARE EDUCATION/TRAINING PROGRAM

## 2024-04-24 PROCEDURE — 25000242 PHARM REV CODE 250 ALT 637 W/ HCPCS: Performed by: STUDENT IN AN ORGANIZED HEALTH CARE EDUCATION/TRAINING PROGRAM

## 2024-04-24 PROCEDURE — 99900035 HC TECH TIME PER 15 MIN (STAT)

## 2024-04-24 PROCEDURE — 94761 N-INVAS EAR/PLS OXIMETRY MLT: CPT

## 2024-04-24 PROCEDURE — 25000003 PHARM REV CODE 250: Performed by: NURSE PRACTITIONER

## 2024-04-24 PROCEDURE — 63600175 PHARM REV CODE 636 W HCPCS: Performed by: STUDENT IN AN ORGANIZED HEALTH CARE EDUCATION/TRAINING PROGRAM

## 2024-04-24 PROCEDURE — 80053 COMPREHEN METABOLIC PANEL: CPT | Performed by: STUDENT IN AN ORGANIZED HEALTH CARE EDUCATION/TRAINING PROGRAM

## 2024-04-24 PROCEDURE — 25000003 PHARM REV CODE 250: Performed by: STUDENT IN AN ORGANIZED HEALTH CARE EDUCATION/TRAINING PROGRAM

## 2024-04-24 PROCEDURE — 27000221 HC OXYGEN, UP TO 24 HOURS

## 2024-04-24 PROCEDURE — 25000003 PHARM REV CODE 250

## 2024-04-24 PROCEDURE — 83690 ASSAY OF LIPASE: CPT | Performed by: NURSE PRACTITIONER

## 2024-04-24 PROCEDURE — 97162 PT EVAL MOD COMPLEX 30 MIN: CPT

## 2024-04-24 PROCEDURE — 94799 UNLISTED PULMONARY SVC/PX: CPT

## 2024-04-24 PROCEDURE — 20600001 HC STEP DOWN PRIVATE ROOM

## 2024-04-24 PROCEDURE — 84100 ASSAY OF PHOSPHORUS: CPT | Performed by: STUDENT IN AN ORGANIZED HEALTH CARE EDUCATION/TRAINING PROGRAM

## 2024-04-24 PROCEDURE — 94640 AIRWAY INHALATION TREATMENT: CPT

## 2024-04-24 PROCEDURE — 63600175 PHARM REV CODE 636 W HCPCS: Performed by: NURSE PRACTITIONER

## 2024-04-24 PROCEDURE — 83735 ASSAY OF MAGNESIUM: CPT | Performed by: STUDENT IN AN ORGANIZED HEALTH CARE EDUCATION/TRAINING PROGRAM

## 2024-04-24 PROCEDURE — 94799 UNLISTED PULMONARY SVC/PX: CPT | Mod: XB

## 2024-04-24 PROCEDURE — 36415 COLL VENOUS BLD VENIPUNCTURE: CPT | Performed by: NURSE PRACTITIONER

## 2024-04-24 PROCEDURE — 85027 COMPLETE CBC AUTOMATED: CPT | Performed by: STUDENT IN AN ORGANIZED HEALTH CARE EDUCATION/TRAINING PROGRAM

## 2024-04-24 PROCEDURE — 97116 GAIT TRAINING THERAPY: CPT

## 2024-04-24 RX ORDER — NALOXONE HCL 0.4 MG/ML
0.02 VIAL (ML) INJECTION
Status: DISCONTINUED | OUTPATIENT
Start: 2024-04-24 | End: 2024-04-26 | Stop reason: HOSPADM

## 2024-04-24 RX ORDER — SODIUM,POTASSIUM PHOSPHATES 280-250MG
2 POWDER IN PACKET (EA) ORAL ONCE
Status: COMPLETED | OUTPATIENT
Start: 2024-04-24 | End: 2024-04-24

## 2024-04-24 RX ORDER — HYDROMORPHONE HYDROCHLORIDE 1 MG/ML
0.5 INJECTION, SOLUTION INTRAMUSCULAR; INTRAVENOUS; SUBCUTANEOUS EVERY 6 HOURS PRN
Status: DISCONTINUED | OUTPATIENT
Start: 2024-04-24 | End: 2024-04-26 | Stop reason: HOSPADM

## 2024-04-24 RX ORDER — HYDROMORPHONE HCL IN 0.9% NACL 6 MG/30 ML
PATIENT CONTROLLED ANALGESIA SYRINGE INTRAVENOUS CONTINUOUS
Status: DISCONTINUED | OUTPATIENT
Start: 2024-04-24 | End: 2024-04-25

## 2024-04-24 RX ORDER — POLYETHYLENE GLYCOL 3350 17 G/17G
17 POWDER, FOR SOLUTION ORAL DAILY
Status: DISCONTINUED | OUTPATIENT
Start: 2024-04-24 | End: 2024-04-26 | Stop reason: HOSPADM

## 2024-04-24 RX ORDER — OXYCODONE HYDROCHLORIDE 5 MG/1
5 TABLET ORAL EVERY 4 HOURS PRN
Status: DISCONTINUED | OUTPATIENT
Start: 2024-04-24 | End: 2024-04-24

## 2024-04-24 RX ORDER — SODIUM CHLORIDE, SODIUM LACTATE, POTASSIUM CHLORIDE, CALCIUM CHLORIDE 600; 310; 30; 20 MG/100ML; MG/100ML; MG/100ML; MG/100ML
INJECTION, SOLUTION INTRAVENOUS CONTINUOUS
Status: DISCONTINUED | OUTPATIENT
Start: 2024-04-24 | End: 2024-04-25

## 2024-04-24 RX ORDER — OXYBUTYNIN CHLORIDE 5 MG/1
10 TABLET, EXTENDED RELEASE ORAL DAILY
Status: DISCONTINUED | OUTPATIENT
Start: 2024-04-24 | End: 2024-04-26 | Stop reason: HOSPADM

## 2024-04-24 RX ORDER — ACETAMINOPHEN 325 MG/1
650 TABLET ORAL EVERY 6 HOURS
Status: DISCONTINUED | OUTPATIENT
Start: 2024-04-24 | End: 2024-04-26 | Stop reason: HOSPADM

## 2024-04-24 RX ORDER — OXYCODONE HYDROCHLORIDE 10 MG/1
10 TABLET ORAL EVERY 4 HOURS PRN
Status: DISCONTINUED | OUTPATIENT
Start: 2024-04-24 | End: 2024-04-24

## 2024-04-24 RX ORDER — METHOCARBAMOL 500 MG/1
500 TABLET, FILM COATED ORAL 4 TIMES DAILY
Status: DISCONTINUED | OUTPATIENT
Start: 2024-04-24 | End: 2024-04-26 | Stop reason: HOSPADM

## 2024-04-24 RX ORDER — AMOXICILLIN 250 MG
1 CAPSULE ORAL DAILY PRN
Status: DISCONTINUED | OUTPATIENT
Start: 2024-04-24 | End: 2024-04-26 | Stop reason: HOSPADM

## 2024-04-24 RX ADMIN — LEVALBUTEROL HYDROCHLORIDE 0.63 MG: 0.63 SOLUTION RESPIRATORY (INHALATION) at 07:04

## 2024-04-24 RX ADMIN — SODIUM CHLORIDE, POTASSIUM CHLORIDE, SODIUM LACTATE AND CALCIUM CHLORIDE: 600; 310; 30; 20 INJECTION, SOLUTION INTRAVENOUS at 10:04

## 2024-04-24 RX ADMIN — ATORVASTATIN CALCIUM 40 MG: 40 TABLET, FILM COATED ORAL at 08:04

## 2024-04-24 RX ADMIN — METHOCARBAMOL 1000 MG: 100 INJECTION, SOLUTION INTRAMUSCULAR; INTRAVENOUS at 05:04

## 2024-04-24 RX ADMIN — FLUOXETINE HYDROCHLORIDE 80 MG: 20 CAPSULE ORAL at 08:04

## 2024-04-24 RX ADMIN — Medication: at 06:04

## 2024-04-24 RX ADMIN — LEVOTHYROXINE SODIUM 50 MCG: 50 TABLET ORAL at 05:04

## 2024-04-24 RX ADMIN — ACETAMINOPHEN 650 MG: 325 TABLET ORAL at 01:04

## 2024-04-24 RX ADMIN — OXYBUTYNIN CHLORIDE 10 MG: 5 TABLET, EXTENDED RELEASE ORAL at 09:04

## 2024-04-24 RX ADMIN — Medication: at 12:04

## 2024-04-24 RX ADMIN — LEVALBUTEROL HYDROCHLORIDE 0.63 MG: 0.63 SOLUTION RESPIRATORY (INHALATION) at 01:04

## 2024-04-24 RX ADMIN — AMLODIPINE BESYLATE 10 MG: 10 TABLET ORAL at 08:04

## 2024-04-24 RX ADMIN — ACETAMINOPHEN 650 MG: 325 TABLET ORAL at 05:04

## 2024-04-24 RX ADMIN — METHOCARBAMOL 500 MG: 500 TABLET ORAL at 08:04

## 2024-04-24 RX ADMIN — POTASSIUM & SODIUM PHOSPHATES POWDER PACK 280-160-250 MG 2 PACKET: 280-160-250 PACK at 09:04

## 2024-04-24 RX ADMIN — METHOCARBAMOL 500 MG: 500 TABLET ORAL at 01:04

## 2024-04-24 RX ADMIN — FAMOTIDINE 20 MG: 20 TABLET ORAL at 08:04

## 2024-04-24 RX ADMIN — ENOXAPARIN SODIUM 40 MG: 40 INJECTION SUBCUTANEOUS at 05:04

## 2024-04-24 RX ADMIN — LEVALBUTEROL HYDROCHLORIDE 0.63 MG: 0.63 SOLUTION RESPIRATORY (INHALATION) at 08:04

## 2024-04-24 RX ADMIN — PIPERACILLIN SODIUM AND TAZOBACTAM SODIUM 4.5 G: 4; .5 INJECTION, POWDER, FOR SOLUTION INTRAVENOUS at 09:04

## 2024-04-24 RX ADMIN — Medication: at 09:04

## 2024-04-24 RX ADMIN — FAMOTIDINE 20 MG: 20 TABLET ORAL at 09:04

## 2024-04-24 RX ADMIN — PROPRANOLOL HYDROCHLORIDE 30 MG: 20 TABLET ORAL at 08:04

## 2024-04-24 RX ADMIN — PROPRANOLOL HYDROCHLORIDE 30 MG: 20 TABLET ORAL at 09:04

## 2024-04-24 NOTE — BRIEF OP NOTE
[facility] - Surgery ([facility])  Brief Operative Note    SUMMARY     Surgery Date: 4/23/2024     Surgeons and Role:     * Estuardo Underwood MD - Primary     * Julia Coughlin MD - Resident - Assisting    Pre-op Diagnosis:  Choledocholithiasis [K80.50]    Post-op Diagnosis:  Post-Op Diagnosis Codes:     * Choledocholithiasis [K80.50]    Procedure(s) (LRB):  CHOLECYSTECTOMY, LAPAROSCOPIC, WITH CHOLANGIOGRAM (N/A)  ERCP (ENDOSCOPIC RETROGRADE CHOLANGIOPANCREATOGRAPHY) (N/A)  GASTROTOMY WITH CLOSURE    Anesthesia: General    Implants:  * No implants in log *    Operative Findings: Laparoscopic cholecystectomy with intraoperative cholangiogram. Laparoscopic assisted ERCP. Liver biopsy.     Estimated Blood Loss: 500 mL    Estimated Blood Loss has been documented.         Specimens:   Specimen (24h ago, onward)       Start     Ordered    04/23/24 1926  Specimen to Pathology, Surgery General Surgery  Once        Comments: Pre-op Diagnosis: Choledocholithiasis [K80.50]Procedure(s):CHOLECYSTECTOMY, LAPAROSCOPIC, WITH CHOLANGIOGRAMERCP (ENDOSCOPIC RETROGRADE CHOLANGIOPANCREATOGRAPHY) Number of specimens: 4Name of specimens: 1-Hepatic node - FROZEN-SENT2-Hepatic mass - FROZEN-SENT    3-Gallbladder - Permanent    4-Additional liver nodules  - Permanent     References:    Click here for ordering Quick Tip   Question Answer Comment   Procedure Type: General Surgery    Specimen Class: Known or suspected malignancy    Release to patient Immediate        04/23/24 1927                    [id number]    Julia Coughlin MD  General Surgery PGY5

## 2024-04-24 NOTE — SUBJECTIVE & OBJECTIVE
Interval History: OR yesterday for lap ronna, IOC and lap assisted ERCP. Doing well. AF/HDS. Tolerating CLD. Pain controlled.     Medications:  Continuous Infusions:  Current Facility-Administered Medications   Medication Dose Route Frequency Last Rate Last Admin     Scheduled Meds:  Current Facility-Administered Medications   Medication Dose Route Frequency    acetaminophen  650 mg Oral Q6H    amLODIPine  10 mg Oral Daily    atorvastatin  40 mg Oral QHS    enoxparin  40 mg Subcutaneous Daily    famotidine  20 mg Oral BID    FLUoxetine  80 mg Oral Daily    levalbuterol  0.63 mg Nebulization Q6H WAKE    levothyroxine  50 mcg Oral Before breakfast    methocarbamoL  500 mg Oral QID    oxybutynin  10 mg Oral Daily    polyethylene glycol  17 g Oral Daily    propranoloL  30 mg Oral BID     PRN Meds:  Current Facility-Administered Medications:     acetaminophen, 650 mg, Oral, Q8H PRN    oxyCODONE, 5 mg, Oral, Q4H PRN    oxyCODONE, 10 mg, Oral, Q4H PRN    prochlorperazine, 5 mg, Intravenous, Q6H PRN    senna-docusate 8.6-50 mg, 1 tablet, Oral, Daily PRN     Review of patient's allergies indicates:  No Known Allergies  Objective:     Vital Signs (Most Recent):  Temp: 97.4 °F (36.3 °C) (04/24/24 0755)  Pulse: 91 (04/24/24 0755)  Resp: 12 (04/24/24 0755)  BP: 106/70 (04/24/24 0755)  SpO2: 96 % (04/24/24 0755) Vital Signs (24h Range):  Temp:  [97.2 °F (36.2 °C)-98.3 °F (36.8 °C)] 97.4 °F (36.3 °C)  Pulse:  [59-91] 91  Resp:  [12-20] 12  SpO2:  [95 %-100 %] 96 %  BP: (106-162)/(66-87) 106/70     Weight: 78.9 kg (174 lb)  Body mass index is 31.83 kg/m².    Intake/Output - Last 3 Shifts         04/22 0700  04/23 0659 04/23 0700  04/24 0659 04/24 0700 04/25 0659    P.O.   960    IV Piggyback  3050     Total Intake(mL/kg)  3050 (38.7) 960 (12.2)    Urine (mL/kg/hr)  1200 (0.6)     Blood  500     Total Output  1700     Net  +1350 +960                    Physical Exam  Vitals and nursing note reviewed.   Constitutional:        General: She is not in acute distress.     Appearance: Normal appearance.   HENT:      Nose: Nose normal.      Mouth/Throat:      Mouth: Mucous membranes are moist.   Cardiovascular:      Rate and Rhythm: Normal rate and regular rhythm.   Pulmonary:      Effort: Pulmonary effort is normal. No respiratory distress.   Abdominal:      Comments: Abdomen soft, non-distended. Abdomen appropriately tender  Incision CDI with dermabond   Musculoskeletal:         General: Normal range of motion.   Skin:     General: Skin is warm.      Coloration: Skin is not jaundiced.   Neurological:      General: No focal deficit present.      Mental Status: She is alert.          Significant Labs:  I have reviewed all pertinent lab results within the past 24 hours.  CBC:   Recent Labs   Lab 04/24/24  0518   WBC 19.00*   RBC 4.04   HGB 12.7   HCT 38.8      MCV 96   MCH 31.4*   MCHC 32.7     CMP:   Recent Labs   Lab 04/24/24  0518   *   CALCIUM 9.1   ALBUMIN 2.8*   PROT 6.7      K 3.7   CO2 23      BUN 7   CREATININE 0.6   ALKPHOS 414*   *   *   BILITOT 0.5       Significant Diagnostics:  I have reviewed all pertinent imaging results/findings within the past 24 hours.

## 2024-04-24 NOTE — PLAN OF CARE
[facility] - Surgery (2nd Fl)  Initial Discharge Assessment       Primary Care Provider: No, Primary Doctor    Admission Diagnosis: Choledocholithiasis [K80.50]  Cholecystitis [K81.9]    Admission Date: 4/19/2024  Expected Discharge Date: 4/24/2024    Transition of Care Barriers: None    Payor: MEDICARE / Plan: MEDICARE PART A & B / Product Type: Government /     Extended Emergency Contact Information  Primary Emergency Contact: Ajay Roberson  Address: [address] [facility] [address]           [place] [address] [place]  Mobile Phone: [phone]  Relation: Spouse    Discharge Plan A: Home with family  Discharge Plan B: [facility]      [id number] - [place], [place] - [address] AT [address] [facility] [address]  [address]  [facility] [place] [address]  Phone: [phone] Fax: [phone]      Initial Assessment (most recent)       Adult Discharge Assessment - 04/23/24 1951          Discharge Assessment    Assessment Type Discharge Planning Assessment     Confirmed/corrected address, phone number and insurance Yes     Confirmed Demographics Correct on Facesheet     Source of Information patient     When was your last doctors appointment? 03/04/24     Communicated LUIS with patient/caregiver Yes     People in Home spouse     Do you expect to return to your current living situation? Yes     Do you have help at home or someone to help you manage your care at home? Yes     Who are your caregiver(s) and their phone number(s)?      Prior to hospitilization cognitive status: Alert/Oriented     Current cognitive status: Alert/Oriented     Home Layout Able to live on 1st floor     Do you take prescription medications? Yes     Do you have prescription coverage? Yes     Do you have any problems affording any of your prescribed medications? No     Is the patient taking medications as prescribed? yes     Who is going to help you get home at discharge?      How do  you get to doctors appointments? family or friend will provide     Are you on dialysis? No     Do you take coumadin? No     Discharge Plan A Home with family     Discharge Plan B Home Health     Discharge Plan discussed with: Patient     Transition of Care Barriers None     SDOH --   no       Physical Activity    On average, how many days per week do you engage in moderate to strenuous exercise (like a brisk walk)? 0 days     On average, how many minutes do you engage in exercise at this level? 0 min        Financial Resource Strain    How hard is it for you to pay for the very basics like food, housing, medical care, and heating? Not hard at all        Housing Stability    In the last 12 months, was there a time when you were not able to pay the mortgage or rent on time? No     At any time in the past 12 months, were you homeless or living in a shelter (including now)? No        Transportation Needs    In the past 12 months, has lack of transportation kept you from medical appointments or from getting medications? No     In the past 12 months, has lack of transportation kept you from meetings, work, or from getting things needed for daily living? No        Food Insecurity    Within the past 12 months, you worried that your food would run out before you got the money to buy more. Never true     Within the past 12 months, the food you bought just didn't last and you didn't have money to get more. Never true        Stress    Do you feel stress - tense, restless, nervous, or anxious, or unable to sleep at night because your mind is troubled all the time - these days? Not at all        Social Connections    In a typical week, how many times do you talk on the phone with family, friends, or neighbors? More than three times a week     How often do you get together with friends or relatives? More than three times a week     How often do you attend U[employer] or Anglican services? More than 4 times per year     Do you belong  to any clubs or organizations such as Anabaptism groups, unions, fraternal or athletic groups, or school groups? No     How often do you attend meetings of the clubs or organizations you belong to? More than 4 times per year     Are you , , , , never , or living with a partner?         Alcohol Use    Q1: How often do you have a drink containing alcohol? Never     Q2: How many drinks containing alcohol do you have on a typical day when you are drinking? Patient does not drink     Q3: How often do you have six or more drinks on one occasion? Never                   The CM met with the patient at bedside to complete the DPA. The CM placed name and contact information on the blackboard in the patient's room.  Use preferred pharmacy / bedside delivery for any necessary  medications at the time of discharge.The patient is independent with all ADLs. The patient is not on Dialysis or Coumadin. The patient's  will provide assistance to the patient upon discharge. The patient's  will provide transportation upon discharge .  The CM will continue to follow for course of hospitalization.

## 2024-04-24 NOTE — TREATMENT PLAN
[facility]  [facility]  Treatment Plan    Patient Name: Anila Roberson  MRN: [id number]  Admission Date: 4/19/2024  Hospital Length of Stay: 5 days    Subjective:     Interval History:  Intra-op ERCP with small stones and debris removed yesterday. Cholecystectomy as well.     Reports some significant generalized abdominal pain. On clear liquid diet. Lipase wnl.     No current facility-administered medications on file prior to encounter.     Current Outpatient Medications on File Prior to Encounter   Medication Sig Dispense Refill    propranoloL (INDERAL) 10 MG tablet Take 30 mg by mouth 2 (two) times daily.      amLODIPine (NORVASC) 10 MG tablet Take 10 mg by mouth once daily.      atorvastatin (LIPITOR) 80 MG tablet Take 40 mg by mouth every evening.      BANOPHEN 25 mg capsule Take 25 mg by mouth nightly as needed.      butalbital-acetaminophen-caffeine -40 mg (FIORICET, ESGIC) -40 mg per tablet Take 1 tablet by mouth 2 (two) times daily as needed for Headaches.      estradioL (ESTRACE) 1 MG tablet Take 1 tablet by mouth every evening.      famotidine (PEPCID) 20 MG tablet Take 20 mg by mouth 2 (two) times daily.      FLUoxetine 20 MG capsule Take 80 mg by mouth once daily.      levothyroxine (SYNTHROID) 50 MCG tablet Take 50 mcg by mouth before breakfast.      magnesium oxide 420 mg Tab Take 420 mg by mouth once daily.      MYRBETRIQ 50 mg Tb24 Take 1 tablet by mouth every morning.      promethazine (PHENERGAN) 25 MG tablet Take 1 tablet by mouth every 6 (six) hours as needed for Nausea.      tiZANidine (ZANAFLEX) 2 MG tablet Take 2 mg by mouth every 8 (eight) hours as needed.      tolterodine (DETROL LA) 4 MG 24 hr capsule Take 4 mg by mouth every evening.      topiramate (TOPAMAX) 100 MG tablet Take 100 mg by mouth 2 (two) times daily.      zinc sulfate (ZINCATE) 50 mg zinc (220 mg) capsule Take 50 mg by mouth once daily.         Review of patient's allergies indicates:  No Known  Allergies      Objective:     Vitals:    04/24/24 0914   BP:    Pulse:    Resp: 18   Temp:          Constitutional:  not in acute distress and well developed  HENT: Head: Normal, normocephalic, atraumatic.  Eyes: conjunctiva clear and sclera nonicteric  GI: soft and tenderness moderate in the epigastrium  Skin: normal color  Neurological: alert,       Significant Labs:  Recent Labs   Lab 04/23/24  0326 04/23/24  2037 04/24/24  0518   HGB 12.8 14.0 12.7       Lab Results   Component Value Date    WBC 19.00 (H) 04/24/2024    HGB 12.7 04/24/2024    HCT 38.8 04/24/2024    MCV 96 04/24/2024     04/24/2024       Lab Results   Component Value Date     04/24/2024    K 3.7 04/24/2024     04/24/2024    CO2 23 04/24/2024    BUN 7 04/24/2024    CREATININE 0.6 04/24/2024    CALCIUM 9.1 04/24/2024    ANIONGAP 12 04/24/2024       Lab Results   Component Value Date     (H) 04/24/2024     (H) 04/24/2024    ALKPHOS 414 (H) 04/24/2024    BILITOT 0.5 04/24/2024       Lab Results   Component Value Date    INR 1.0 04/18/2024       Significant Imaging:  Reviewed pertinent radiology findings.       Assessment/Plan:     54 y.o. female with HTN, HLD and s/p RNY gastric bypass (2002) who was scheduled for outpatient cholecystectomy with joint intra-operative ERCP that [facility] with low grade fevers, RUQ pain and nausea. Admitted to the surgical oncology service. AES consulted for inpatient intraoperative ERCP with possible plans for surgical intervention this weekend. She reports that her pain is worst in the epigastric region. She does report intermittent nausea and vomiting. She is not on any blood thinners.     S/p ERCP in OR with lap ronna on 04/23--remains with some abd pain but lipase wnl    Problem List:  Choledocholithiasis  Cholecystitis    Recommendations:  Advance diet as tolerated  No repeat ERCP needed    Thank you for involving us in the care of Anila Roberson. Please call with any  additional questions, concerns or changes in the patient's clinical status. We will continue to follow. .    Lacho Weldon MD  Gastroenterology Fellow PGY IV   [facility]

## 2024-04-24 NOTE — PLAN OF CARE
Problem: Adult Inpatient Plan of Care  Goal: Plan of Care Review  Outcome: Not Progressing  Goal: Patient-Specific Goal (Individualized)  Outcome: Not Progressing  Goal: Absence of Hospital-Acquired Illness or Injury  Outcome: Not Progressing  Goal: Optimal Comfort and Wellbeing  Outcome: Not Progressing  Goal: Readiness for Transition of Care  Outcome: Not Progressing     Problem: Fall Injury Risk  Goal: Absence of Fall and Fall-Related Injury  Outcome: Not Progressing     Problem: Wound  Goal: Optimal Coping  Outcome: Not Progressing  Goal: Optimal Functional Ability  Outcome: Not Progressing  Goal: Absence of Infection Signs and Symptoms  Outcome: Not Progressing  Goal: Improved Oral Intake  Outcome: Not Progressing  Goal: Optimal Pain Control and Function  Outcome: Not Progressing  Goal: Skin Health and Integrity  Outcome: Not Progressing  Goal: Optimal Wound Healing  Outcome: Not Progressing     Problem: Infection  Goal: Absence of Infection Signs and Symptoms  Outcome: Not Progressing

## 2024-04-24 NOTE — PLAN OF CARE
Problem: Physical Therapy  Goal: Physical Therapy Goal  Description: Goals to be met by: 5/3     Patient will increase functional independence with mobility by performin. Supine to sit with Modified [place]  2. Sit to supine with Modified [place]  3. Sit to stand transfer with Modified [place]  4. Gait  x 400 feet with Modified [place] using No Assistive Device.   5. Ascend/descend 1 flight stair with left Handrails stand by assistance  using No Assistive Device.     Outcome: Progressing   Evaluation completed, initiated plan of care.   Violetta Bocanegra, PT  2024

## 2024-04-24 NOTE — NURSING TRANSFER
Nursing Transfer Note      4/23/2024 2148     Nurse giving handoff:leo correa  Nurse receiving handoff:leo lawson    Reason patient is being transferred: post procedure    Transfer To: 1009    Transfer via bed    Transfer with 1l of  O2    Transported by transport    Transfer Vital Signs: see flowsheets    Order for Tele Monitor? No    Additional Lines: Garber Catheter and o2    4eyes on Skin: yes    Medicines sent: pca dilaudid    Any special needs or follow-up needed: routine    Patient belongings transferred with patient:  none at bedside    Chart send with patient: Yes    Notified: family    Patient reassessed at: 4/23/24 2130 (

## 2024-04-24 NOTE — PT/OT/SLP EVAL
"Physical Therapy Evaluation    Patient Name:  Anila Roberson   MRN:  [id number]    Recommendations:     Discharge Recommendations: No Therapy Indicated   Discharge Equipment Recommendations: none   Barriers to discharge: None    Assessment:     Anila Roberson is a 54 y.o. female admitted with a medical diagnosis of Choledocholithiasis.  She presents with the following impairments/functional limitations: weakness, gait instability, impaired endurance, impaired self care skills, impaired functional mobility, pain, impaired cardiopulmonary response to activity, impaired balance. The patient's mobility was limited by post-op pain. She ambulated 250' with L HHA due to mild instability with gait, gait tolerance limited by pain.      Rehab Prognosis: Good; patient would benefit from acute skilled PT services to address these deficits and reach maximum level of function.    Recent Surgery: Procedure(s) (LRB):  CHOLECYSTECTOMY, LAPAROSCOPIC, WITH CHOLANGIOGRAM (N/A)  ERCP (ENDOSCOPIC RETROGRADE CHOLANGIOPANCREATOGRAPHY) (N/A)  GASTROTOMY WITH CLOSURE 1 Day Post-Op    Plan:     During this hospitalization, patient to be seen 3 x/week to address the identified rehab impairments via gait training, therapeutic activities, therapeutic exercises, neuromuscular re-education and progress toward the following goals:    Plan of Care Expires:  05/24/24    Subjective     Chief Complaint: "I just got back in bed, I walked earlier"  Patient/Family Comments/goals: "I want to get back home to see my dogs"  Pain/Comfort:  Pain Rating 1: 9/10  Location 1: abdomen  Pain Addressed 1: Reposition, Distraction, Pre-medicate for activity  Pain Rating Post-Intervention 1: 9/10    Patients cultural, spiritual, Cheondoism conflicts given the current situation: no    Living Environment:  The patient lives with her  (she is caregiver for her ), 2 SH (18 L HR), 0 COOPER. T/S.   Prior to admission, patients level of function was independent.  " Equipment used at home: none.  DME owned (not currently used):  has access to ADLs, owns teak shower stool .  Upon discharge, patient will have not have assistance.    Objective:     Communicated with RN prior to session.  Patient found HOB elevated with peripheral IV, oxygen, telemetry, PCA, basurto catheter  upon PT entry to room.    General Precautions: Standard, fall  Orthopedic Precautions:N/A   Braces: N/A  Respiratory Status: Nasal cannula, flow 1 L/min    Exams:    Cognitive Exam  Patient is A&O x4 and follows 100% of one -step commands    Fine Motor Coordination   -       WNL     Postural Exam Patient presented with the following abnormalities:    -       Rounded shoulders  -       Forward head  -       Kyphosis  -       Posterior pelvic tilt     Sensation    -       Light touch intact LE   Skin Integrity/Edema     -       Skin integrity: incision clean and dry  -       Edema: WNL   R LE ROM WNL   R LE Strength WNL   L LE ROM WNL   L LE Strength  WNL     Functional Mobility:    Bed Mobility  Supine to Sit on the R side:  stand by assistance, HOB elevated, increased time   Sit to supine: stand by assistance    Transfers Sit to Stand:  stand by assistance    Gait  Gait Distance: 250 ft with L HHA vs wall surfing   Assistance Level: contact guard assist  Description: impaired weight shift, decreased step length, slow deliberate gait speed    Removed O2 for gait, patient denied SOB with gait          AM-PAC 6 CLICK MOBILITY  Total Score:23       Treatment & Education:  Patient educated on:  -role of therapy  -goals of session  -PT POC  -benefits of out of bed mobility and consequences of immobility  -calling for staff assist to mobilize safely  Patient agreeable to mobilize with therapy.      Gait training: cued for upright posture, reciprocal strides, pacing for energy conservation     Patient encouraged to ambulate, sit up in chair 3x/day to prevent deconditioning during hospitalization. Patient verbalized  understanding and agreement to mobilize only with RN assist for safety.     Patient safe to ambulate with RN.     Patient left HOB elevated with all lines intact and call button in reach.    GOALS:   Multidisciplinary Problems       Physical Therapy Goals          Problem: Physical Therapy    Goal Priority Disciplines Outcome Goal Variances Interventions   Physical Therapy Goal     PT, PT/OT Progressing     Description: Goals to be met by: 5/3     Patient will increase functional independence with mobility by performin. Supine to sit with Modified [place]  2. Sit to supine with Modified [place]  3. Sit to stand transfer with Modified [place]  4. Gait  x 400 feet with Modified [place] using No Assistive Device.   5. Ascend/descend 1 flight stair with left Handrails stand by assistance  using No Assistive Device.                          History:     Past Medical History:   Diagnosis Date    Arthritis     Atrial fibrillation     GERD (gastroesophageal reflux disease)     Hyperlipidemia     Hypertension     Hypothyroidism     Migraine headache     Sleep apnea, unspecified        Past Surgical History:   Procedure Laterality Date    CARDIAC ELECTROPHYSIOLOGY STUDY AND ABLATION      CERVICAL FUSION      cage C4-C7    ERCP N/A 2024    Procedure: ERCP (ENDOSCOPIC RETROGRADE CHOLANGIOPANCREATOGRAPHY);  Surgeon: Estuardo Underwood MD;  Location: [facility] OR [address] [place] [address];  Service: General;  Laterality: N/A;    GASTRIC BYPASS  2002    GASTROTOMY  2024    Procedure: GASTROTOMY WITH CLOSURE;  Surgeon: Estuardo Underwood MD;  Location: [facility] OR [address] [place] [address];  Service: General;;    LAPAROSCOPIC CHOLECYSTECTOMY WITH CHOLANGIOGRAPHY N/A 2024    Procedure: CHOLECYSTECTOMY, LAPAROSCOPIC, WITH CHOLANGIOGRAM;  Surgeon: Estuardo Underwood MD;  Location: [facility] OR [address] [place] [address];  Service: General;  Laterality: N/A;    SPINE SURGERY      L5-S1       Time Tracking:     PT Received On: 24  PT Start Time: 4601      PT Stop Time: 1222  PT Total Time (min): 27 min     Billable Minutes: Evaluation 10 and Gait Training 17      04/24/2024

## 2024-04-24 NOTE — CARE UPDATE
I have reviewed the chart of Anila Roberson who is hospitalized for the following:    Active Hospital Problems    Diagnosis    *Choledocholithiasis    HLD (hyperlipidemia)     Resumed home meds      Obesity (BMI 30-39.9)     Present on admission      HTN (hypertension)     Resumed home meds      Hypothyroid     Present on admission      Hyponatremia     Monitor daily labs      Hypophosphatemia     Monitor daily labs      GERD (gastroesophageal reflux disease)     Resumed home meds          Sravanthi Hernandez PA-C  [facility]

## 2024-04-24 NOTE — OP NOTE
Williams Cain - [facility]  Surgery Department  Operative Note       Date of Procedure: 4/23/2024     Procedure: Procedure(s) (LRB):  CHOLECYSTECTOMY, LAPAROSCOPIC, WITH CHOLANGIOGRAM (N/A)  ERCP (ENDOSCOPIC RETROGRADE CHOLANGIOPANCREATOGRAPHY) (N/A)  GASTROTOMY WITH CLOSURE     Surgeons and Role:     * Estuardo Underwood MD - Primary     * Julia Coughlin MD - Resident - Assisting        Pre-Operative Diagnosis:   Choledocholithiasis [K80.50]  History of rich en y gastric bypass    Post-Operative Diagnosis:   Same  Same    Comorbidities:  Arthritis  Atrial fibrillation  GERD  Hyperlipidemia  Hypertension  Hypothyroidism  Sleep apnea    Anesthesia: General    Operative Findings:   Gallbladder with evidence of chronic inflammation and gallstones  5-6 mm common bile duct stone  Prior gastric bypass with moderate amount of adhesions in left upper quadrant  2 cm soft lesion in segment 5 of the liver with adjacent small white liver nodules. Frozen section of these lesions negative for definite evidence of malignancy. Small white left lateral segment liver nodules    Procedures:  Laparoscopic cholecystectomy with intraoperative cholangiogram with interpretation of cholangiogram imagnes  Liver biopsy x 3  Gastrotomy to facilitate ERCP performed by Dr. Mccormack with gastrotomy closure    Estimated Blood Loss (EBL):  500 mL           Indications:  Anila Roberson is a 54 year old woman with history of rich en y gastric bypass with choledocholithiasis, mildly elevated LFTs, and 2 weeks of epigastric pain. I recommended laparoscopic cholecystectomy with possible laparoscopic common bile duct exploration and possible laparoscopic assisted ERCP. Risks and benefits were reviewed including but not limited to: bleeding, infection, bile duct injury, bile leak, gastric remnant leak, injury to other abdominal organs, cardiovascular and pulmonary complications, need for additional procedures, death, and imponderables.  She understands and signed  written informed consent to proceed.    Details:  The patient was transported to the operating room and satisfactory anesthesia established. Preoperative antibiotics were administered.  The patient was placed in the supine position and extremities were padded and protected throughout. The abdomen was prepped and draped. An appropriate timeout was performed.    The abdomen was entered in the right upper quadrant using a 5 mm trocar and the Optiview technique. The abdomen was insufflated. The site of entry was examined to ensure there was no injury. Additional 5 mm trocars were placed in the right upper quadrant and epigastrium. A 10 mm trocar was placed supraumbilically. There was a loop of small bowel adherent to the midline and falciform ligament superior to the periumbilical port.     The gallbladder was grasped with a laparoscopic grasper. The gallbladder wall was thickened and appeared chronically inflamed. There was some omentum adherent to the lateral aspect of the gallbladder. This was dissected off with cautery. Once the omentum had been dissected off the gallbladder, there were numerous small white nodules on the inferior aspect of the liver in segments 5 and 6. These were surrounding a 2 cm soft lesion closer to the gallbladder in segment 5. A biopsy of one of the smaller white nodules and of the 2 cm mass were taken and sent to pathology for frozen section. These returned negative for definite evidence of malignancy. I thought it was prudent to proceed with cholecystectomy since these nodules were indeterminate and the patient was very symptomatic from choledocholithiasis.     We proceeded with cholecystectomy. The peritoneum was dissected off the gallbladder laterally and medially. The cystic duct and artery were dissected out with the cystic plate between to achieve the critical view of safety. Two clips were placed proximally on the cystic artery, and one clip was placed distally. The artery was  divided with scissors. One clip was then placed on the distal cystic duct at the gallbladder neck. A small cystic ductotomy was made. A cholangiogram catheter was introduced through the ductotomy. A cholangiogram was performed with Omnipaque. One 5-6 mm stone was visualized in the distal common bile duct as a filling defect on the cholangiogram. Glucagon was given, and I attempted to flush the stone distally with saline. The duodenum filled with contrast, but the stone moved more proximally in the common hepatic duct on the cholangiogram after the flush. Dr. Mccormack looked at the images as well, and we decided to proceed with ERCP. Two clips were placed on the proximal side of the cystic duct. The gallbladder was dissected off the cystic plate and placed in a specimen bag. It was removed from the supraumbilical incision and sent to pathology.     An additional 5 mm trocar was placed in the left lateral abdomen. The patient had a prior antecolic gastric bypass. The jejunal limb of the gastrojejunostomy was visualized anteriorly. There were some adhesions of the jejunum to the left lobe of the liver that were sharply divided. The greater omentum was divided in the left upper quadrant to mobilize the splenic flexure, enter the lesser sac, and expose the greater curvature of the stomach. The greater curvature was visualized. The jejunal limb was overlying this, but we were able to free enough omentum to visualize what appeared to be a good location for a gastrotomy. A gastrotomy was made in the greater curvature of the remnant stomach. Two 2-0 silk stay sutures were placed on either side of the gastrotomy. An incision was made in the left upper quadrant, and a 15 mm trocar was placed through this incision. The trocar was introduced through the gastrotomy. The stay sutures were retracted externally using the Pavel Diana device. Dr. Mccormack thought it may be difficult to perform an ERCP through the port at that point, so  I placed an additional stay suture to try to pull the stomach up to the abdominal wall more. Unfortunately, this suture pulled through and made the gastrotomy bigger. Ultimately I could not pull the stomach up to the abdominal wall enough to facilitate ERCP. Therefore, I made a small upper midline incision. A wound protector was placed. The stomach was pulled up to this incision. The gastrotomy was closed with a running 3-0 PDS that was not tied. Dr. Mccormack introduced the ERCP scope through the gastrotomy with the suture pulled tight around the scope. He found a single common bile duct stone as seen on the cholangiogram and successfully removed the stone after sphincterotomy. See his operative note for details. The GI team then left. I evaluated the gastrotomy closure through the upper midline incision. It seemed completely closed, but I thought it would be best to place an additional suture for closure. The suture line was oversewn with an absorbable 3-0 v lock suture, which was tied to the initial 3-0 PDS. There were additional small white nodules on the left lateral segment of the liver. A biopsy of several of these nodules was taken with cautery. Aniyah knit was placed on the liver transection surface.     The upper midline wound was closed with 0 PDS suture. The 15 mm port was closed with 0 Vicryl suture. The abdomen was reinsufflated and examined. Everything appeared hemostatic, and there was no evidence of bowel injury. The clips on the cystic duct and artery were intact. The gastric remnant suture line was intact. The 10 mm port site was closed with 0 Vicryl suture using the Pavel Diana device. The CO2 was evacuated and the other ports removed. The wound was irrigated and skin closed with Monocryl and dermabond.      All needle, lap, and sponge counts were reported as correct. I communicated the intraoperative findings with the family following the procedure.     Implants: * No implants in log  *    Specimens:   Specimen (24h ago, onward)       Start     Ordered    04/23/24 1926  Specimen to Pathology, Surgery General Surgery  Once        Comments: Pre-op Diagnosis: Choledocholithiasis [K80.50]Procedure(s):CHOLECYSTECTOMY, LAPAROSCOPIC, WITH CHOLANGIOGRAMERCP (ENDOSCOPIC RETROGRADE CHOLANGIOPANCREATOGRAPHY) Number of specimens: 4Name of specimens: 1-Hepatic node - FROZEN-SENT2-Hepatic mass - FROZEN-SENT    3-Gallbladder - Permanent    4-Additional liver nodules  - Permanent     References:    Click here for ordering Quick Tip   Question Answer Comment   Procedure Type: General Surgery    Specimen Class: Known or suspected malignancy    Release to patient Immediate        04/23/24 1927                            Condition: Good    Disposition: PACU - hemodynamically stable.    Attestation:  I was present and scrubbed for my entire portion of the procedure.    Estuardo Underwood MD  Staff Surgeon  Surgical Oncology

## 2024-04-24 NOTE — PROGRESS NOTES
Williams Cain - [facility]  General Surgery  Progress Note    Subjective:     History of Present Illness:  Anila Roberson is a 54yoF who is known to the surgical oncology service. She has a history significant for HTN, HLD, and Que-en-Y gastric bypass who was scheduled for outpatient cholecystectomy with joint intra-operative ERCP with Dr. Tovar. She was seen in our clinic yesterday. The patient began to experience, low grade fevers, worsening right upper quadrant abdominal pain, nausea, PO aversion. Given the worsening pain and fevers, she was instructed to present to the emergency department.     After the clinic visit yesterday, her CMP was significant for elevated ALP, AST, and ALT. Her CT scan demonstrates continued common bile duct dilation to 1cm. In the emergency department today, she continues to endorse epigastric abdominal pain and remains tender. She is not jaundiced and does not demonstrate signs of peritonitis.     She has had a previous bypass and hysterectomy. She takes no blood thinning medications.     Post-Op Info:  Procedure(s) (LRB):  CHOLECYSTECTOMY, LAPAROSCOPIC, WITH CHOLANGIOGRAM (N/A)  ERCP (ENDOSCOPIC RETROGRADE CHOLANGIOPANCREATOGRAPHY) (N/A)  GASTROTOMY WITH CLOSURE   1 Day Post-Op     Interval History: OR yesterday for lap ronna, IOC and lap assisted ERCP. Doing well. AF/HDS. Tolerating CLD. Pain controlled.     Medications:  Continuous Infusions:  Current Facility-Administered Medications   Medication Dose Route Frequency Last Rate Last Admin     Scheduled Meds:  Current Facility-Administered Medications   Medication Dose Route Frequency    acetaminophen  650 mg Oral Q6H    amLODIPine  10 mg Oral Daily    atorvastatin  40 mg Oral QHS    enoxparin  40 mg Subcutaneous Daily    famotidine  20 mg Oral BID    FLUoxetine  80 mg Oral Daily    levalbuterol  0.63 mg Nebulization Q6H WAKE    levothyroxine  50 mcg Oral Before breakfast    methocarbamoL  500 mg Oral QID    oxybutynin  10 mg Oral Daily     polyethylene glycol  17 g Oral Daily    propranoloL  30 mg Oral BID     PRN Meds:  Current Facility-Administered Medications:     acetaminophen, 650 mg, Oral, Q8H PRN    oxyCODONE, 5 mg, Oral, Q4H PRN    oxyCODONE, 10 mg, Oral, Q4H PRN    prochlorperazine, 5 mg, Intravenous, Q6H PRN    senna-docusate 8.6-50 mg, 1 tablet, Oral, Daily PRN     Review of patient's allergies indicates:  No Known Allergies  Objective:     Vital Signs (Most Recent):  Temp: 97.4 °F (36.3 °C) (04/24/24 0755)  Pulse: 91 (04/24/24 0755)  Resp: 12 (04/24/24 0755)  BP: 106/70 (04/24/24 0755)  SpO2: 96 % (04/24/24 0755) Vital Signs (24h Range):  Temp:  [97.2 °F (36.2 °C)-98.3 °F (36.8 °C)] 97.4 °F (36.3 °C)  Pulse:  [59-91] 91  Resp:  [12-20] 12  SpO2:  [95 %-100 %] 96 %  BP: (106-162)/(66-87) 106/70     Weight: 78.9 kg (174 lb)  Body mass index is 31.83 kg/m².    Intake/Output - Last 3 Shifts         04/22 0700  04/23 0659 04/23 0700  04/24 0659 04/24 0700  04/25 0659    P.O.   960    IV Piggyback  3050     Total Intake(mL/kg)  3050 (38.7) 960 (12.2)    Urine (mL/kg/hr)  1200 (0.6)     Blood  500     Total Output  1700     Net  +1350 +960                    Physical Exam  Vitals and nursing note reviewed.   Constitutional:       General: She is not in acute distress.     Appearance: Normal appearance.   HENT:      Nose: Nose normal.      Mouth/Throat:      Mouth: Mucous membranes are moist.   Cardiovascular:      Rate and Rhythm: Normal rate and regular rhythm.   Pulmonary:      Effort: Pulmonary effort is normal. No respiratory distress.   Abdominal:      Comments: Abdomen soft, non-distended. Abdomen appropriately tender  Incision CDI with dermabond   Musculoskeletal:         General: Normal range of motion.   Skin:     General: Skin is warm.      Coloration: Skin is not jaundiced.   Neurological:      General: No focal deficit present.      Mental Status: She is alert.          Significant Labs:  I have reviewed all pertinent lab results  within the past 24 hours.  CBC:   Recent Labs   Lab 04/24/24  0518   WBC 19.00*   RBC 4.04   HGB 12.7   HCT 38.8      MCV 96   MCH 31.4*   MCHC 32.7     CMP:   Recent Labs   Lab 04/24/24  0518   *   CALCIUM 9.1   ALBUMIN 2.8*   PROT 6.7      K 3.7   CO2 23      BUN 7   CREATININE 0.6   ALKPHOS 414*   *   *   BILITOT 0.5       Significant Diagnostics:  I have reviewed all pertinent imaging results/findings within the past 24 hours.  Assessment/Plan:     * Choledocholithiasis  54 y.o. female with hx of Que-en-Y gastric bypass with choledocholithiasis. She is 1 Day Post-Op laparoscopic cholecystectomy, IOC and assisted ERCP with successful clearance of duct    Advance to regular diet  Discontinue PCA. PRN oral MM  OOB/Ambulate  Discontinue basurto  Discontinue IV antibiotics  PRN anti emetics  DVT ppx  Daily labs        Julia Coughlin MD  General Surgery  [facility]

## 2024-04-24 NOTE — ANESTHESIA POSTPROCEDURE EVALUATION
Anesthesia Post Evaluation    Patient: Anila Roberson    Procedure(s) Performed: Procedure(s) (LRB):  CHOLECYSTECTOMY, LAPAROSCOPIC, WITH CHOLANGIOGRAM (N/A)  ERCP (ENDOSCOPIC RETROGRADE CHOLANGIOPANCREATOGRAPHY) (N/A)  GASTROTOMY WITH CLOSURE    Final Anesthesia Type: general      Patient location during evaluation: PACU  Patient participation: Yes- Able to Participate  Level of consciousness: awake and alert  Post-procedure vital signs: reviewed and stable  Pain management: adequate  Airway patency: patent    PONV status at discharge: No PONV  Anesthetic complications: no      Cardiovascular status: stable  Respiratory status: unassisted, spontaneous ventilation and face mask  Hydration status: euvolemic  Follow-up not needed.              Vitals Value Taken Time   /70 04/24/24 0425   Temp 36.8 °C (98.3 °F) 04/24/24 0425   Pulse 91 04/24/24 0425   Resp 18 04/24/24 0628   SpO2 98 % 04/24/24 0425         Event Time   Out of Recovery 04/23/2024 21:00:00         Pain/Joanne Score: Pain Rating Prior to Med Admin: 10 (4/23/2024  8:49 PM)  Pain Rating Post Med Admin: 4 (4/23/2024  9:00 PM)  Joanne Score: 9 (4/23/2024  9:00 PM)

## 2024-04-24 NOTE — TRANSFER OF CARE
"Anesthesia Transfer of Care Note    Patient: Anila Roberson    Procedure(s) Performed: Procedure(s) (LRB):  CHOLECYSTECTOMY, LAPAROSCOPIC, WITH CHOLANGIOGRAM (N/A)  ERCP (ENDOSCOPIC RETROGRADE CHOLANGIOPANCREATOGRAPHY) (N/A)  GASTROTOMY WITH CLOSURE    Patient location: PACU    Anesthesia Type: general    Transport from OR: Transported from OR on 6-10 L/min O2 by face mask with adequate spontaneous ventilation    Post pain: adequate analgesia    Post assessment: no apparent anesthetic complications and tolerated procedure well    Post vital signs: stable    Level of consciousness: awake and alert    Nausea/Vomiting: no nausea/vomiting    Complications: none    Transfer of care protocol was followed    Last vitals: Visit Vitals  BP (!) 162/84 (BP Location: Left arm, Patient Position: Lying)   Pulse (!) 59   Temp 36.6 °C (97.9 °F) (Oral)   Resp 18   Ht 5' 2" (1.575 m)   Wt 78.9 kg (174 lb)   SpO2 99%   Breastfeeding No   BMI 31.83 kg/m²     "

## 2024-04-24 NOTE — ASSESSMENT & PLAN NOTE
54 y.o. female with hx of Que-en-Y gastric bypass with choledocholithiasis. She is 1 Day Post-Op laparoscopic cholecystectomy, IOC and assisted ERCP with successful clearance of duct    Advance to regular diet  Discontinue PCA. PRN oral MM  OOB/Ambulate  Discontinue basurto  Discontinue IV antibiotics  PRN anti emetics  DVT ppx  Daily labs

## 2024-04-25 LAB
ALBUMIN SERPL BCP-MCNC: 2.5 G/DL (ref 3.5–5.2)
ALP SERPL-CCNC: 301 U/L (ref 55–135)
ALT SERPL W/O P-5'-P-CCNC: 137 U/L (ref 10–44)
ANION GAP SERPL CALC-SCNC: 11 MMOL/L (ref 8–16)
AST SERPL-CCNC: 152 U/L (ref 10–40)
BASOPHILS # BLD AUTO: 0.01 K/UL (ref 0–0.2)
BASOPHILS NFR BLD: 0.1 % (ref 0–1.9)
BILIRUB SERPL-MCNC: 0.5 MG/DL (ref 0.1–1)
BUN SERPL-MCNC: 11 MG/DL (ref 6–20)
CALCIUM SERPL-MCNC: 8.9 MG/DL (ref 8.7–10.5)
CHLORIDE SERPL-SCNC: 101 MMOL/L (ref 95–110)
CO2 SERPL-SCNC: 21 MMOL/L (ref 23–29)
CREAT SERPL-MCNC: 0.7 MG/DL (ref 0.5–1.4)
DIFFERENTIAL METHOD BLD: ABNORMAL
EOSINOPHIL # BLD AUTO: 0 K/UL (ref 0–0.5)
EOSINOPHIL NFR BLD: 0.1 % (ref 0–8)
ERYTHROCYTE [DISTWIDTH] IN BLOOD BY AUTOMATED COUNT: 12.9 % (ref 11.5–14.5)
EST. GFR  (NO RACE VARIABLE): >60 ML/MIN/1.73 M^2
GLUCOSE SERPL-MCNC: 100 MG/DL (ref 70–110)
HCT VFR BLD AUTO: 33.1 % (ref 37–48.5)
HGB BLD-MCNC: 10.5 G/DL (ref 12–16)
IMM GRANULOCYTES # BLD AUTO: 0.17 K/UL (ref 0–0.04)
IMM GRANULOCYTES NFR BLD AUTO: 1.2 % (ref 0–0.5)
LYMPHOCYTES # BLD AUTO: 1.1 K/UL (ref 1–4.8)
LYMPHOCYTES NFR BLD: 8.3 % (ref 18–48)
MAGNESIUM SERPL-MCNC: 1.7 MG/DL (ref 1.6–2.6)
MCH RBC QN AUTO: 30.1 PG (ref 27–31)
MCHC RBC AUTO-ENTMCNC: 31.7 G/DL (ref 32–36)
MCV RBC AUTO: 95 FL (ref 82–98)
MONOCYTES # BLD AUTO: 0.7 K/UL (ref 0.3–1)
MONOCYTES NFR BLD: 4.9 % (ref 4–15)
NEUTROPHILS # BLD AUTO: 11.6 K/UL (ref 1.8–7.7)
NEUTROPHILS NFR BLD: 85.4 % (ref 38–73)
NRBC BLD-RTO: 0 /100 WBC
PHOSPHATE SERPL-MCNC: 1.9 MG/DL (ref 2.7–4.5)
PLATELET # BLD AUTO: 296 K/UL (ref 150–450)
PMV BLD AUTO: 9.7 FL (ref 9.2–12.9)
POTASSIUM SERPL-SCNC: 3.1 MMOL/L (ref 3.5–5.1)
PROT SERPL-MCNC: 6.2 G/DL (ref 6–8.4)
RBC # BLD AUTO: 3.49 M/UL (ref 4–5.4)
SODIUM SERPL-SCNC: 133 MMOL/L (ref 136–145)
WBC # BLD AUTO: 13.61 K/UL (ref 3.9–12.7)

## 2024-04-25 PROCEDURE — 25000003 PHARM REV CODE 250: Performed by: NURSE PRACTITIONER

## 2024-04-25 PROCEDURE — 83735 ASSAY OF MAGNESIUM: CPT | Performed by: STUDENT IN AN ORGANIZED HEALTH CARE EDUCATION/TRAINING PROGRAM

## 2024-04-25 PROCEDURE — 80053 COMPREHEN METABOLIC PANEL: CPT | Performed by: STUDENT IN AN ORGANIZED HEALTH CARE EDUCATION/TRAINING PROGRAM

## 2024-04-25 PROCEDURE — 36415 COLL VENOUS BLD VENIPUNCTURE: CPT | Performed by: STUDENT IN AN ORGANIZED HEALTH CARE EDUCATION/TRAINING PROGRAM

## 2024-04-25 PROCEDURE — 63600175 PHARM REV CODE 636 W HCPCS

## 2024-04-25 PROCEDURE — 25000242 PHARM REV CODE 250 ALT 637 W/ HCPCS: Performed by: STUDENT IN AN ORGANIZED HEALTH CARE EDUCATION/TRAINING PROGRAM

## 2024-04-25 PROCEDURE — 85025 COMPLETE CBC W/AUTO DIFF WBC: CPT | Performed by: STUDENT IN AN ORGANIZED HEALTH CARE EDUCATION/TRAINING PROGRAM

## 2024-04-25 PROCEDURE — 25000003 PHARM REV CODE 250: Performed by: SURGERY

## 2024-04-25 PROCEDURE — 25000003 PHARM REV CODE 250: Performed by: STUDENT IN AN ORGANIZED HEALTH CARE EDUCATION/TRAINING PROGRAM

## 2024-04-25 PROCEDURE — 63600175 PHARM REV CODE 636 W HCPCS: Performed by: STUDENT IN AN ORGANIZED HEALTH CARE EDUCATION/TRAINING PROGRAM

## 2024-04-25 PROCEDURE — 94799 UNLISTED PULMONARY SVC/PX: CPT

## 2024-04-25 PROCEDURE — 84100 ASSAY OF PHOSPHORUS: CPT | Performed by: STUDENT IN AN ORGANIZED HEALTH CARE EDUCATION/TRAINING PROGRAM

## 2024-04-25 PROCEDURE — 99900035 HC TECH TIME PER 15 MIN (STAT)

## 2024-04-25 PROCEDURE — 94640 AIRWAY INHALATION TREATMENT: CPT

## 2024-04-25 PROCEDURE — 25000003 PHARM REV CODE 250

## 2024-04-25 PROCEDURE — 94761 N-INVAS EAR/PLS OXIMETRY MLT: CPT

## 2024-04-25 PROCEDURE — 20600001 HC STEP DOWN PRIVATE ROOM

## 2024-04-25 RX ORDER — POTASSIUM CHLORIDE 20 MEQ/1
40 TABLET, EXTENDED RELEASE ORAL ONCE
Status: COMPLETED | OUTPATIENT
Start: 2024-04-25 | End: 2024-04-25

## 2024-04-25 RX ORDER — PANTOPRAZOLE SODIUM 40 MG/1
40 TABLET, DELAYED RELEASE ORAL
Status: DISCONTINUED | OUTPATIENT
Start: 2024-04-25 | End: 2024-04-26 | Stop reason: HOSPADM

## 2024-04-25 RX ORDER — MUPIROCIN 20 MG/G
OINTMENT TOPICAL 2 TIMES DAILY
Status: DISCONTINUED | OUTPATIENT
Start: 2024-04-25 | End: 2024-04-26 | Stop reason: HOSPADM

## 2024-04-25 RX ORDER — POTASSIUM CHLORIDE 7.45 MG/ML
10 INJECTION INTRAVENOUS
Status: COMPLETED | OUTPATIENT
Start: 2024-04-25 | End: 2024-04-25

## 2024-04-25 RX ORDER — OXYCODONE HYDROCHLORIDE 10 MG/1
10 TABLET ORAL EVERY 4 HOURS PRN
Status: DISCONTINUED | OUTPATIENT
Start: 2024-04-25 | End: 2024-04-26 | Stop reason: HOSPADM

## 2024-04-25 RX ORDER — OXYCODONE HYDROCHLORIDE 5 MG/1
5 TABLET ORAL EVERY 4 HOURS PRN
Status: DISCONTINUED | OUTPATIENT
Start: 2024-04-25 | End: 2024-04-26 | Stop reason: HOSPADM

## 2024-04-25 RX ORDER — SODIUM,POTASSIUM PHOSPHATES 280-250MG
2 POWDER IN PACKET (EA) ORAL ONCE
Status: COMPLETED | OUTPATIENT
Start: 2024-04-25 | End: 2024-04-25

## 2024-04-25 RX ADMIN — POTASSIUM CHLORIDE 10 MEQ: 7.46 INJECTION, SOLUTION INTRAVENOUS at 08:04

## 2024-04-25 RX ADMIN — OXYCODONE HYDROCHLORIDE 10 MG: 10 TABLET ORAL at 10:04

## 2024-04-25 RX ADMIN — POTASSIUM & SODIUM PHOSPHATES POWDER PACK 280-160-250 MG 2 PACKET: 280-160-250 PACK at 12:04

## 2024-04-25 RX ADMIN — PROPRANOLOL HYDROCHLORIDE 30 MG: 20 TABLET ORAL at 08:04

## 2024-04-25 RX ADMIN — OXYCODONE HYDROCHLORIDE 10 MG: 10 TABLET ORAL at 08:04

## 2024-04-25 RX ADMIN — POTASSIUM CHLORIDE 40 MEQ: 1500 TABLET, EXTENDED RELEASE ORAL at 06:04

## 2024-04-25 RX ADMIN — METHOCARBAMOL 500 MG: 500 TABLET ORAL at 05:04

## 2024-04-25 RX ADMIN — OXYBUTYNIN CHLORIDE 10 MG: 5 TABLET, EXTENDED RELEASE ORAL at 08:04

## 2024-04-25 RX ADMIN — ACETAMINOPHEN 650 MG: 325 TABLET ORAL at 05:04

## 2024-04-25 RX ADMIN — OXYCODONE HYDROCHLORIDE 10 MG: 10 TABLET ORAL at 05:04

## 2024-04-25 RX ADMIN — LEVALBUTEROL HYDROCHLORIDE 0.63 MG: 0.63 SOLUTION RESPIRATORY (INHALATION) at 08:04

## 2024-04-25 RX ADMIN — OXYCODONE HYDROCHLORIDE 10 MG: 10 TABLET ORAL at 12:04

## 2024-04-25 RX ADMIN — POTASSIUM CHLORIDE 10 MEQ: 7.46 INJECTION, SOLUTION INTRAVENOUS at 06:04

## 2024-04-25 RX ADMIN — LEVALBUTEROL HYDROCHLORIDE 0.63 MG: 0.63 SOLUTION RESPIRATORY (INHALATION) at 02:04

## 2024-04-25 RX ADMIN — POTASSIUM CHLORIDE 10 MEQ: 7.46 INJECTION, SOLUTION INTRAVENOUS at 10:04

## 2024-04-25 RX ADMIN — FLUOXETINE HYDROCHLORIDE 80 MG: 20 CAPSULE ORAL at 08:04

## 2024-04-25 RX ADMIN — LEVOTHYROXINE SODIUM 50 MCG: 50 TABLET ORAL at 05:04

## 2024-04-25 RX ADMIN — POTASSIUM CHLORIDE 10 MEQ: 7.46 INJECTION, SOLUTION INTRAVENOUS at 11:04

## 2024-04-25 RX ADMIN — PANTOPRAZOLE SODIUM 40 MG: 40 TABLET, DELAYED RELEASE ORAL at 08:04

## 2024-04-25 RX ADMIN — ENOXAPARIN SODIUM 40 MG: 40 INJECTION SUBCUTANEOUS at 05:04

## 2024-04-25 RX ADMIN — PANTOPRAZOLE SODIUM 40 MG: 40 TABLET, DELAYED RELEASE ORAL at 05:04

## 2024-04-25 RX ADMIN — METHOCARBAMOL 500 MG: 500 TABLET ORAL at 09:04

## 2024-04-25 RX ADMIN — POTASSIUM CHLORIDE 10 MEQ: 7.46 INJECTION, SOLUTION INTRAVENOUS at 09:04

## 2024-04-25 RX ADMIN — ACETAMINOPHEN 650 MG: 325 TABLET ORAL at 12:04

## 2024-04-25 RX ADMIN — AMLODIPINE BESYLATE 10 MG: 10 TABLET ORAL at 08:04

## 2024-04-25 RX ADMIN — MUPIROCIN: 20 OINTMENT TOPICAL at 09:04

## 2024-04-25 RX ADMIN — SODIUM CHLORIDE, POTASSIUM CHLORIDE, SODIUM LACTATE AND CALCIUM CHLORIDE: 600; 310; 30; 20 INJECTION, SOLUTION INTRAVENOUS at 05:04

## 2024-04-25 RX ADMIN — ATORVASTATIN CALCIUM 40 MG: 40 TABLET, FILM COATED ORAL at 09:04

## 2024-04-25 RX ADMIN — MUPIROCIN: 20 OINTMENT TOPICAL at 08:04

## 2024-04-25 RX ADMIN — METHOCARBAMOL 500 MG: 500 TABLET ORAL at 12:04

## 2024-04-25 NOTE — PLAN OF CARE
[facility] Plan of Care Note  Dx Choledocholithiasis    Shift Events none    Goals of Care: pain management, monitor drains, ambulate    Neuro: AAO x 4    Vital Signs: VSS    Respiratory: RA    Diet: Clear Liquid    Is patient tolerating current diet? Yes    GTTS: Lr @ 125    Urine Output/Bowel Movement: adequate urine output and last bm 4/24/24    Drains/Tubes/Tube Feeds (include total output/shift): none    Lines: 20 g right FA      Accuchecks: none    Skin: 6 laps with DB redness and bruise    Fall Risk Score: 15    Activity level? independent    Any scheduled procedures? none    Any safety concerns? Fall precautions    Other: none    Problem: Adult Inpatient Plan of Care  Goal: Plan of Care Review  Outcome: Progressing  Goal: Patient-Specific Goal (Individualized)  Outcome: Progressing  Goal: Absence of Hospital-Acquired Illness or Injury  Outcome: Progressing  Goal: Optimal Comfort and Wellbeing  Outcome: Progressing  Goal: Readiness for Transition of Care  Outcome: Progressing     Problem: Fall Injury Risk  Goal: Absence of Fall and Fall-Related Injury  Outcome: Progressing     Problem: Wound  Goal: Optimal Coping  Outcome: Progressing  Goal: Optimal Functional Ability  Outcome: Progressing  Goal: Absence of Infection Signs and Symptoms  Outcome: Progressing  Goal: Improved Oral Intake  Outcome: Progressing  Goal: Optimal Pain Control and Function  Outcome: Progressing  Goal: Skin Health and Integrity  Outcome: Progressing  Goal: Optimal Wound Healing  Outcome: Progressing     Problem: Infection  Goal: Absence of Infection Signs and Symptoms  Outcome: Progressing

## 2024-04-25 NOTE — CONSULTS
KEVIN consulted for PIV insertion in real time using ultrasound guidance.    Indication: PVA  Gauge and length: 20 g x 1 3/4 inch   Location: RFA

## 2024-04-25 NOTE — PLAN OF CARE
[facility] Plan of Care Note  Dx Choledocholithiasis     Shift Events d/c mIVF, potassium replaced, d/c PCA pump - pain controlled with PRN medications, voiding post basurto removal, up to chair and ambulate in hallway x 2     Goals of Care: pain management, progressive mobility     Neuro: AAO x 4     Vital Signs: VSS     Respiratory: RA     Diet: Regular     Is patient tolerating current diet? Yes     GTTS: Lr @ 125 discontinued, potassium replaced     Urine Output/Bowel Movement: adequate urine output post basurto removal, last bm 4/22     Drains/Tubes/Tube Feeds (include total output/shift): none     Lines: 20 g right FA     Accuchecks: none     Skin: 6 laps with DB redness and bruise     Fall Risk Score: 17     Activity level? independent     Any scheduled procedures? none     Any safety concerns? Fall precautions, unfamiliar environment     Other: none    Problem: Adult Inpatient Plan of Care  Goal: Plan of Care Review  Outcome: Progressing  Goal: Patient-Specific Goal (Individualized)  Outcome: Progressing  Goal: Absence of Hospital-Acquired Illness or Injury  Outcome: Progressing  Goal: Optimal Comfort and Wellbeing  Outcome: Progressing     Problem: Fall Injury Risk  Goal: Absence of Fall and Fall-Related Injury  Outcome: Progressing     Problem: Wound  Goal: Optimal Coping  Outcome: Progressing     Problem: Infection  Goal: Absence of Infection Signs and Symptoms  Outcome: Progressing

## 2024-04-25 NOTE — ASSESSMENT & PLAN NOTE
54 y.o. female with hx of Que-en-Y gastric bypass with choledocholithiasis. She is 2 Days Post-Op laparoscopic cholecystectomy, IOC and assisted ERCP with successful clearance of duct    Advance to regular diet  Discontinue PCA. PRN oral MM  OOB/Ambulate  PRN anti emetics  Protonix  Home meds  DVT ppx  Daily labs    Dispo: [facility]

## 2024-04-25 NOTE — SUBJECTIVE & OBJECTIVE
Interval History: NAEON. Patient is passing gas but is without a bowel movement yet. Her pain is improved when taking the muscle relaxant. Encouraged her to be up and walking today.    Medications:  Continuous Infusions:  Current Facility-Administered Medications   Medication Dose Route Frequency Last Rate Last Admin    lactated ringers   Intravenous Continuous 125 mL/hr at 04/25/24 0538 New Bag at 04/25/24 0538     Scheduled Meds:  Current Facility-Administered Medications   Medication Dose Route Frequency    acetaminophen  650 mg Oral Q6H    amLODIPine  10 mg Oral Daily    atorvastatin  40 mg Oral QHS    enoxparin  40 mg Subcutaneous Daily    FLUoxetine  80 mg Oral Daily    levalbuterol  0.63 mg Nebulization Q6H WAKE    levothyroxine  50 mcg Oral Before breakfast    methocarbamoL  500 mg Oral QID    mupirocin   Nasal BID    oxybutynin  10 mg Oral Daily    pantoprazole  40 mg Oral BID AC    polyethylene glycol  17 g Oral Daily    potassium chloride  10 mEq Intravenous Q1H    propranoloL  30 mg Oral BID     PRN Meds:  Current Facility-Administered Medications:     acetaminophen, 650 mg, Oral, Q8H PRN    HYDROmorphone, 0.5 mg, Intravenous, Q6H PRN    naloxone, 0.02 mg, Intravenous, PRN    oxyCODONE, 5 mg, Oral, Q4H PRN    oxyCODONE, 10 mg, Oral, Q4H PRN    prochlorperazine, 5 mg, Intravenous, Q6H PRN    senna-docusate 8.6-50 mg, 1 tablet, Oral, Daily PRN     Review of patient's allergies indicates:  No Known Allergies  Objective:     Vital Signs (Most Recent):  Temp: 97.8 °F (36.6 °C) (04/25/24 0807)  Pulse: 79 (04/25/24 0807)  Resp: 16 (04/25/24 0833)  BP: 125/63 (04/25/24 0807)  SpO2: 99 % (04/25/24 0807) Vital Signs (24h Range):  Temp:  [97.4 °F (36.3 °C)-98.4 °F (36.9 °C)] 97.8 °F (36.6 °C)  Pulse:  [] 79  Resp:  [14-18] 16  SpO2:  [96 %-100 %] 99 %  BP: ()/(58-66) 125/63     Weight: 78.9 kg (174 lb)  Body mass index is 31.83 kg/m².    Intake/Output - Last 3 Shifts         04/23 0700  04/24 0659 04/24  0700  04/25 0659 04/25 0700  04/26 0659    P.O.  2160     IV Piggyback 3050  104.3    Total Intake(mL/kg) 3050 (38.7) 2160 (27.4) 104.3 (1.3)    Urine (mL/kg/hr) 1200 (0.6) 1250 (0.7) 475 (1.6)    Blood 500      Total Output 1700 1250 475    Net +1350 +910 -370.7           Stool Occurrence  0 x              Physical Exam  Vitals and nursing note reviewed.   Constitutional:       General: She is not in acute distress.     Appearance: Normal appearance.   Cardiovascular:      Rate and Rhythm: Normal rate and regular rhythm.   Pulmonary:      Effort: Pulmonary effort is normal. No respiratory distress.   Abdominal:      Comments: Abdomen soft, non-distended. Abdomen appropriately tender  Incision CDI with dermabond   Musculoskeletal:         General: Normal range of motion.   Skin:     General: Skin is warm.      Coloration: Skin is not jaundiced.   Neurological:      General: No focal deficit present.      Mental Status: She is alert.          Significant Labs:  I have reviewed all pertinent lab results within the past 24 hours.  CBC:   Recent Labs   Lab 04/25/24 0412   WBC 13.61*   RBC 3.49*   HGB 10.5*   HCT 33.1*      MCV 95   MCH 30.1   MCHC 31.7*     CMP:   Recent Labs   Lab 04/25/24 0412      CALCIUM 8.9   ALBUMIN 2.5*   PROT 6.2   *   K 3.1*   CO2 21*      BUN 11   CREATININE 0.7   ALKPHOS 301*   *   *   BILITOT 0.5       Significant Diagnostics:  I have reviewed all pertinent imaging results/findings within the past 24 hours.

## 2024-04-25 NOTE — PROGRESS NOTES
[facility] - [facility]  General Surgery  Progress Note    Subjective:     History of Present Illness:  Anila Roberson is a 54yoF who is known to the surgical oncology service. She has a history significant for HTN, HLD, and Que-en-Y gastric bypass who was scheduled for outpatient cholecystectomy with joint intra-operative ERCP with Dr. Tovar. She was seen in our clinic yesterday. The patient began to experience, low grade fevers, worsening right upper quadrant abdominal pain, nausea, PO aversion. Given the worsening pain and fevers, she was instructed to present to the emergency department.     After the clinic visit yesterday, her CMP was significant for elevated ALP, AST, and ALT. Her CT scan demonstrates continued common bile duct dilation to 1cm. In the emergency department today, she continues to endorse epigastric abdominal pain and remains tender. She is not jaundiced and does not demonstrate signs of peritonitis.     She has had a previous bypass and hysterectomy. She takes no blood thinning medications.     Post-Op Info:  Procedure(s) (LRB):  CHOLECYSTECTOMY, LAPAROSCOPIC, WITH CHOLANGIOGRAM (N/A)  ERCP (ENDOSCOPIC RETROGRADE CHOLANGIOPANCREATOGRAPHY) (N/A)  GASTROTOMY WITH CLOSURE   2 Days Post-Op     Interval History: NAEON. Patient is passing gas but is without a bowel movement yet. Her pain is improved when taking the muscle relaxant. Encouraged her to be up and walking today.    Medications:  Continuous Infusions:  Current Facility-Administered Medications   Medication Dose Route Frequency Last Rate Last Admin    lactated ringers   Intravenous Continuous 125 mL/hr at 04/25/24 0538 New Bag at 04/25/24 0538     Scheduled Meds:  Current Facility-Administered Medications   Medication Dose Route Frequency    acetaminophen  650 mg Oral Q6H    amLODIPine  10 mg Oral Daily    atorvastatin  40 mg Oral QHS    enoxparin  40 mg Subcutaneous Daily    FLUoxetine  80 mg Oral Daily    levalbuterol  0.63 mg Nebulization Q6H  WAKE    levothyroxine  50 mcg Oral Before breakfast    methocarbamoL  500 mg Oral QID    mupirocin   Nasal BID    oxybutynin  10 mg Oral Daily    pantoprazole  40 mg Oral BID AC    polyethylene glycol  17 g Oral Daily    potassium chloride  10 mEq Intravenous Q1H    propranoloL  30 mg Oral BID     PRN Meds:  Current Facility-Administered Medications:     acetaminophen, 650 mg, Oral, Q8H PRN    HYDROmorphone, 0.5 mg, Intravenous, Q6H PRN    naloxone, 0.02 mg, Intravenous, PRN    oxyCODONE, 5 mg, Oral, Q4H PRN    oxyCODONE, 10 mg, Oral, Q4H PRN    prochlorperazine, 5 mg, Intravenous, Q6H PRN    senna-docusate 8.6-50 mg, 1 tablet, Oral, Daily PRN     Review of patient's allergies indicates:  No Known Allergies  Objective:     Vital Signs (Most Recent):  Temp: 97.8 °F (36.6 °C) (04/25/24 0807)  Pulse: 79 (04/25/24 0807)  Resp: 16 (04/25/24 0833)  BP: 125/63 (04/25/24 0807)  SpO2: 99 % (04/25/24 0807) Vital Signs (24h Range):  Temp:  [97.4 °F (36.3 °C)-98.4 °F (36.9 °C)] 97.8 °F (36.6 °C)  Pulse:  [] 79  Resp:  [14-18] 16  SpO2:  [96 %-100 %] 99 %  BP: ()/(58-66) 125/63     Weight: 78.9 kg (174 lb)  Body mass index is 31.83 kg/m².    Intake/Output - Last 3 Shifts         04/23 0700  04/24 0659 04/24 0700 04/25 0659 04/25 0700 04/26 0659    P.O.  2160     IV Piggyback 3050  104.3    Total Intake(mL/kg) 3050 (38.7) 2160 (27.4) 104.3 (1.3)    Urine (mL/kg/hr) 1200 (0.6) 1250 (0.7) 475 (1.6)    Blood 500      Total Output 1700 1250 475    Net +1350 +910 -370.7           Stool Occurrence  0 x              Physical Exam  Vitals and nursing note reviewed.   Constitutional:       General: She is not in acute distress.     Appearance: Normal appearance.   Cardiovascular:      Rate and Rhythm: Normal rate and regular rhythm.   Pulmonary:      Effort: Pulmonary effort is normal. No respiratory distress.   Abdominal:      Comments: Abdomen soft, non-distended. Abdomen appropriately tender  Incision CDI with dermabond    Musculoskeletal:         General: Normal range of motion.   Skin:     General: Skin is warm.      Coloration: Skin is not jaundiced.   Neurological:      General: No focal deficit present.      Mental Status: She is alert.          Significant Labs:  I have reviewed all pertinent lab results within the past 24 hours.  CBC:   Recent Labs   Lab 04/25/24 0412   WBC 13.61*   RBC 3.49*   HGB 10.5*   HCT 33.1*      MCV 95   MCH 30.1   MCHC 31.7*     CMP:   Recent Labs   Lab 04/25/24 0412      CALCIUM 8.9   ALBUMIN 2.5*   PROT 6.2   *   K 3.1*   CO2 21*      BUN 11   CREATININE 0.7   ALKPHOS 301*   *   *   BILITOT 0.5       Significant Diagnostics:  I have reviewed all pertinent imaging results/findings within the past 24 hours.  Assessment/Plan:     * Choledocholithiasis  54 y.o. female with hx of Que-en-Y gastric bypass with choledocholithiasis. She is 2 Days Post-Op laparoscopic cholecystectomy, IOC and assisted ERCP with successful clearance of duct    Advance to regular diet  Discontinue PCA. PRN oral MM  OOB/Ambulate  PRN anti emetics  Protonix  Home meds  DVT ppx  Daily labs    Dispo: VILMA Villegas MD  General Surgery  [facility]

## 2024-04-26 VITALS
SYSTOLIC BLOOD PRESSURE: 105 MMHG | DIASTOLIC BLOOD PRESSURE: 60 MMHG | WEIGHT: 174 LBS | TEMPERATURE: 98 F | BODY MASS INDEX: 32.02 KG/M2 | OXYGEN SATURATION: 98 % | HEIGHT: 62 IN | RESPIRATION RATE: 16 BRPM | HEART RATE: 77 BPM

## 2024-04-26 LAB
ALBUMIN SERPL BCP-MCNC: 2.2 G/DL (ref 3.5–5.2)
ALP SERPL-CCNC: 240 U/L (ref 55–135)
ALT SERPL W/O P-5'-P-CCNC: 89 U/L (ref 10–44)
ANION GAP SERPL CALC-SCNC: 8 MMOL/L (ref 8–16)
AST SERPL-CCNC: 68 U/L (ref 10–40)
BASOPHILS # BLD AUTO: 0.02 K/UL (ref 0–0.2)
BASOPHILS NFR BLD: 0.2 % (ref 0–1.9)
BILIRUB SERPL-MCNC: 0.5 MG/DL (ref 0.1–1)
BUN SERPL-MCNC: 7 MG/DL (ref 6–20)
CALCIUM SERPL-MCNC: 8.9 MG/DL (ref 8.7–10.5)
CHLORIDE SERPL-SCNC: 103 MMOL/L (ref 95–110)
CO2 SERPL-SCNC: 23 MMOL/L (ref 23–29)
CREAT SERPL-MCNC: 0.6 MG/DL (ref 0.5–1.4)
DIFFERENTIAL METHOD BLD: ABNORMAL
EOSINOPHIL # BLD AUTO: 0 K/UL (ref 0–0.5)
EOSINOPHIL NFR BLD: 0.3 % (ref 0–8)
ERYTHROCYTE [DISTWIDTH] IN BLOOD BY AUTOMATED COUNT: 12.7 % (ref 11.5–14.5)
EST. GFR  (NO RACE VARIABLE): >60 ML/MIN/1.73 M^2
GLUCOSE SERPL-MCNC: 112 MG/DL (ref 70–110)
HCT VFR BLD AUTO: 29 % (ref 37–48.5)
HGB BLD-MCNC: 9.6 G/DL (ref 12–16)
IMM GRANULOCYTES # BLD AUTO: 0.1 K/UL (ref 0–0.04)
IMM GRANULOCYTES NFR BLD AUTO: 0.8 % (ref 0–0.5)
LYMPHOCYTES # BLD AUTO: 1.1 K/UL (ref 1–4.8)
LYMPHOCYTES NFR BLD: 8 % (ref 18–48)
MAGNESIUM SERPL-MCNC: 1.8 MG/DL (ref 1.6–2.6)
MCH RBC QN AUTO: 31.2 PG (ref 27–31)
MCHC RBC AUTO-ENTMCNC: 33.1 G/DL (ref 32–36)
MCV RBC AUTO: 94 FL (ref 82–98)
MONOCYTES # BLD AUTO: 0.6 K/UL (ref 0.3–1)
MONOCYTES NFR BLD: 4.7 % (ref 4–15)
NEUTROPHILS # BLD AUTO: 11.2 K/UL (ref 1.8–7.7)
NEUTROPHILS NFR BLD: 86 % (ref 38–73)
NRBC BLD-RTO: 0 /100 WBC
PHOSPHATE SERPL-MCNC: 1.4 MG/DL (ref 2.7–4.5)
PLATELET # BLD AUTO: 315 K/UL (ref 150–450)
PMV BLD AUTO: 10.4 FL (ref 9.2–12.9)
POTASSIUM SERPL-SCNC: 3.9 MMOL/L (ref 3.5–5.1)
PROT SERPL-MCNC: 5.6 G/DL (ref 6–8.4)
RBC # BLD AUTO: 3.08 M/UL (ref 4–5.4)
SODIUM SERPL-SCNC: 134 MMOL/L (ref 136–145)
WBC # BLD AUTO: 13.06 K/UL (ref 3.9–12.7)

## 2024-04-26 PROCEDURE — 25000003 PHARM REV CODE 250: Performed by: STUDENT IN AN ORGANIZED HEALTH CARE EDUCATION/TRAINING PROGRAM

## 2024-04-26 PROCEDURE — 97116 GAIT TRAINING THERAPY: CPT | Mod: CQ

## 2024-04-26 PROCEDURE — 36415 COLL VENOUS BLD VENIPUNCTURE: CPT | Performed by: STUDENT IN AN ORGANIZED HEALTH CARE EDUCATION/TRAINING PROGRAM

## 2024-04-26 PROCEDURE — 84100 ASSAY OF PHOSPHORUS: CPT | Performed by: STUDENT IN AN ORGANIZED HEALTH CARE EDUCATION/TRAINING PROGRAM

## 2024-04-26 PROCEDURE — 25000003 PHARM REV CODE 250

## 2024-04-26 PROCEDURE — 83735 ASSAY OF MAGNESIUM: CPT | Performed by: STUDENT IN AN ORGANIZED HEALTH CARE EDUCATION/TRAINING PROGRAM

## 2024-04-26 PROCEDURE — 85025 COMPLETE CBC W/AUTO DIFF WBC: CPT | Performed by: STUDENT IN AN ORGANIZED HEALTH CARE EDUCATION/TRAINING PROGRAM

## 2024-04-26 PROCEDURE — 25000003 PHARM REV CODE 250: Performed by: NURSE PRACTITIONER

## 2024-04-26 PROCEDURE — 25000003 PHARM REV CODE 250: Performed by: SURGERY

## 2024-04-26 PROCEDURE — 80053 COMPREHEN METABOLIC PANEL: CPT | Performed by: STUDENT IN AN ORGANIZED HEALTH CARE EDUCATION/TRAINING PROGRAM

## 2024-04-26 RX ORDER — AMOXICILLIN 250 MG
1 CAPSULE ORAL DAILY PRN
Qty: 30 TABLET | Refills: 0 | Status: SHIPPED | OUTPATIENT
Start: 2024-04-26

## 2024-04-26 RX ORDER — OXYCODONE HYDROCHLORIDE 5 MG/1
5 TABLET ORAL EVERY 6 HOURS PRN
Qty: 24 TABLET | Refills: 0 | Status: SHIPPED | OUTPATIENT
Start: 2024-04-26

## 2024-04-26 RX ORDER — METHOCARBAMOL 500 MG/1
500 TABLET, FILM COATED ORAL EVERY 8 HOURS PRN
Qty: 20 TABLET | Refills: 0 | Status: SHIPPED | OUTPATIENT
Start: 2024-04-26 | End: 2024-04-26 | Stop reason: HOSPADM

## 2024-04-26 RX ORDER — ACETAMINOPHEN 325 MG/1
650 TABLET ORAL EVERY 8 HOURS PRN
Qty: 30 TABLET | Refills: 0 | Status: SHIPPED | OUTPATIENT
Start: 2024-04-26

## 2024-04-26 RX ORDER — POLYETHYLENE GLYCOL 3350 17 G/17G
17 POWDER, FOR SOLUTION ORAL DAILY
Qty: 238 G | Refills: 0 | Status: SHIPPED | OUTPATIENT
Start: 2024-04-27 | End: 2024-05-11

## 2024-04-26 RX ORDER — PANTOPRAZOLE SODIUM 40 MG/1
40 TABLET, DELAYED RELEASE ORAL
Qty: 60 TABLET | Refills: 3 | Status: SHIPPED | OUTPATIENT
Start: 2024-04-26

## 2024-04-26 RX ADMIN — FLUOXETINE HYDROCHLORIDE 80 MG: 20 CAPSULE ORAL at 08:04

## 2024-04-26 RX ADMIN — OXYCODONE HYDROCHLORIDE 10 MG: 10 TABLET ORAL at 06:04

## 2024-04-26 RX ADMIN — OXYBUTYNIN CHLORIDE 10 MG: 5 TABLET, EXTENDED RELEASE ORAL at 08:04

## 2024-04-26 RX ADMIN — OXYCODONE HYDROCHLORIDE 10 MG: 10 TABLET ORAL at 11:04

## 2024-04-26 RX ADMIN — ACETAMINOPHEN 650 MG: 325 TABLET ORAL at 12:04

## 2024-04-26 RX ADMIN — ACETAMINOPHEN 650 MG: 325 TABLET ORAL at 05:04

## 2024-04-26 RX ADMIN — LEVOTHYROXINE SODIUM 50 MCG: 50 TABLET ORAL at 05:04

## 2024-04-26 RX ADMIN — AMLODIPINE BESYLATE 10 MG: 10 TABLET ORAL at 08:04

## 2024-04-26 RX ADMIN — OXYCODONE HYDROCHLORIDE 10 MG: 10 TABLET ORAL at 02:04

## 2024-04-26 RX ADMIN — ACETAMINOPHEN 650 MG: 325 TABLET ORAL at 11:04

## 2024-04-26 RX ADMIN — PROPRANOLOL HYDROCHLORIDE 30 MG: 20 TABLET ORAL at 08:04

## 2024-04-26 RX ADMIN — PANTOPRAZOLE SODIUM 40 MG: 40 TABLET, DELAYED RELEASE ORAL at 06:04

## 2024-04-26 NOTE — PLAN OF CARE
Pt. States she will call and make a PCP follow up        Jasmina Eisenberg W  Case Management   [phone]

## 2024-04-26 NOTE — NURSING
"Contact MD Horvath on call in reference to patient blood pressure 82/50 HR 68 MAP 62. Patient does not have any other symptoms bedsides stating, "I just feel tired." Per MD, will continue to monitor patient .   "

## 2024-04-26 NOTE — PLAN OF CARE
[facility] Plan of Care Note  Dx Choledocholithiasis     Shift Events none     Goals of Care: pain management, monitor drains, ambulate     Neuro: AAO x 4     Vital Signs: VSS     Respiratory: RA     Diet: Clear Liquid     Is patient tolerating current diet? Yes     GTTS: Lr @ 125     Urine Output/Bowel Movement: adequate urine output and last bm 4/24/24     Drains/Tubes/Tube Feeds (include total output/shift): none     Lines: 20 g right FA        Accuchecks: none     Skin: 6 laps with DB redness and bruise     Fall Risk Score: 15     Activity level? independent     Any scheduled procedures? none     Any safety concerns? Fall precautions     Other: none    Problem: Adult Inpatient Plan of Care  Goal: Plan of Care Review  Outcome: Progressing  Goal: Patient-Specific Goal (Individualized)  Outcome: Progressing  Goal: Absence of Hospital-Acquired Illness or Injury  Outcome: Progressing  Goal: Optimal Comfort and Wellbeing  Outcome: Progressing  Goal: Readiness for Transition of Care  Outcome: Progressing     Problem: Fall Injury Risk  Goal: Absence of Fall and Fall-Related Injury  Outcome: Progressing     Problem: Wound  Goal: Optimal Coping  Outcome: Progressing  Goal: Optimal Functional Ability  Outcome: Progressing  Goal: Absence of Infection Signs and Symptoms  Outcome: Progressing  Goal: Improved Oral Intake  Outcome: Progressing  Goal: Optimal Pain Control and Function  Outcome: Progressing  Goal: Skin Health and Integrity  Outcome: Progressing  Goal: Optimal Wound Healing  Outcome: Progressing     Problem: Infection  Goal: Absence of Infection Signs and Symptoms  Outcome: Progressing

## 2024-04-26 NOTE — PROGRESS NOTES
[facility] - [facility]  General Surgery  Progress Note    Subjective:     History of Present Illness:  Anila Roberson is a 54yoF who is known to the surgical oncology service. She has a history significant for HTN, HLD, and Que-en-Y gastric bypass who was scheduled for outpatient cholecystectomy with joint intra-operative ERCP with Dr. Tovar. She was seen in our clinic yesterday. The patient began to experience, low grade fevers, worsening right upper quadrant abdominal pain, nausea, PO aversion. Given the worsening pain and fevers, she was instructed to present to the emergency department.     After the clinic visit yesterday, her CMP was significant for elevated ALP, AST, and ALT. Her CT scan demonstrates continued common bile duct dilation to 1cm. In the emergency department today, she continues to endorse epigastric abdominal pain and remains tender. She is not jaundiced and does not demonstrate signs of peritonitis.     She has had a previous bypass and hysterectomy. She takes no blood thinning medications.     Post-Op Info:  Procedure(s) (LRB):  CHOLECYSTECTOMY, LAPAROSCOPIC, WITH CHOLANGIOGRAM (N/A)  ERCP (ENDOSCOPIC RETROGRADE CHOLANGIOPANCREATOGRAPHY) (N/A)  GASTROTOMY WITH CLOSURE   3 Days Post-Op     Interval History: Patient seen and examined. Doing well. Passing flatus. Tolerating diet. Abdominal pain resolving.     Medications:  Continuous Infusions:  Current Facility-Administered Medications   Medication Dose Route Frequency Last Rate Last Admin     Scheduled Meds:  Current Facility-Administered Medications   Medication Dose Route Frequency    acetaminophen  650 mg Oral Q6H    amLODIPine  10 mg Oral Daily    atorvastatin  40 mg Oral QHS    enoxparin  40 mg Subcutaneous Daily    FLUoxetine  80 mg Oral Daily    levothyroxine  50 mcg Oral Before breakfast    methocarbamoL  500 mg Oral QID    mupirocin   Nasal BID    oxybutynin  10 mg Oral Daily    pantoprazole  40 mg Oral BID AC    polyethylene glycol  17 g  Oral Daily    propranoloL  30 mg Oral BID     PRN Meds:  Current Facility-Administered Medications:     acetaminophen, 650 mg, Oral, Q8H PRN    HYDROmorphone, 0.5 mg, Intravenous, Q6H PRN    naloxone, 0.02 mg, Intravenous, PRN    oxyCODONE, 5 mg, Oral, Q4H PRN    oxyCODONE, 10 mg, Oral, Q4H PRN    prochlorperazine, 5 mg, Intravenous, Q6H PRN    senna-docusate 8.6-50 mg, 1 tablet, Oral, Daily PRN     Review of patient's allergies indicates:  No Known Allergies  Objective:     Vital Signs (Most Recent):  Temp: 98.4 °F (36.9 °C) (04/26/24 0310)  Pulse: 85 (04/26/24 0310)  Resp: 18 (04/26/24 0651)  BP: (!) 95/56 (04/26/24 0310)  SpO2: 95 % (04/26/24 0310) Vital Signs (24h Range):  Temp:  [97.4 °F (36.3 °C)-98.4 °F (36.9 °C)] 98.4 °F (36.9 °C)  Pulse:  [] 85  Resp:  [16-18] 18  SpO2:  [94 %-100 %] 95 %  BP: ()/(50-67) 95/56     Weight: 78.9 kg (174 lb)  Body mass index is 31.83 kg/m².    Intake/Output - Last 3 Shifts         04/24 0700  04/25 0659 04/25 0700  04/26 0659 04/26 0700  04/27 0659    P.O. 2160 960     I.V. (mL/kg)  2943.9 (37.3)     IV Piggyback  504.4     Total Intake(mL/kg) 2160 (27.4) 4408.3 (55.9)     Urine (mL/kg/hr) 1250 (0.7) 2350 (1.2)     Stool  0     Blood       Total Output 1250 2350     Net +910 +2058.3            Stool Occurrence 0 x 0 x              Physical Exam  Vitals and nursing note reviewed.   Constitutional:       General: She is not in acute distress.     Appearance: Normal appearance.   Cardiovascular:      Rate and Rhythm: Normal rate and regular rhythm.   Pulmonary:      Effort: Pulmonary effort is normal. No respiratory distress.   Abdominal:      Comments: Abdomen soft, non-distended. Abdomen appropriately tender  Incision CDI with dermabond   Musculoskeletal:         General: Normal range of motion.   Skin:     General: Skin is warm.      Coloration: Skin is not jaundiced.   Neurological:      General: No focal deficit present.      Mental Status: She is alert.           Significant Labs:  I have reviewed all pertinent lab results within the past 24 hours.  CBC:   Recent Labs   Lab 04/26/24  0334   WBC 13.06*   RBC 3.08*   HGB 9.6*   HCT 29.0*      MCV 94   MCH 31.2*   MCHC 33.1     CMP:   Recent Labs   Lab 04/26/24  0334   *   CALCIUM 8.9   ALBUMIN 2.2*   PROT 5.6*   *   K 3.9   CO2 23      BUN 7   CREATININE 0.6   ALKPHOS 240*   ALT 89*   AST 68*   BILITOT 0.5       Significant Diagnostics:  I have reviewed all pertinent imaging results/findings within the past 24 hours.  Assessment/Plan:     * Choledocholithiasis  54 y.o. female with hx of Que-en-Y gastric bypass with choledocholithiasis. She is 3 Days Post-Op laparoscopic cholecystectomy, IOC and assisted ERCP with successful clearance of duct    Tolerating regular diet  Pain controlled on multi-modal regimen  OOB/Ambulate  PRN anti emetics  Protonix  Home meds  DVT ppx  Daily labs    Dispo: likely discharge        Estuardo Phillips MD  General Surgery  [facility]

## 2024-04-26 NOTE — SUBJECTIVE & OBJECTIVE
Interval History: Patient seen and examined. Doing well. Passing flatus. Tolerating diet. Abdominal pain resolving.     Medications:  Continuous Infusions:  Current Facility-Administered Medications   Medication Dose Route Frequency Last Rate Last Admin     Scheduled Meds:  Current Facility-Administered Medications   Medication Dose Route Frequency    acetaminophen  650 mg Oral Q6H    amLODIPine  10 mg Oral Daily    atorvastatin  40 mg Oral QHS    enoxparin  40 mg Subcutaneous Daily    FLUoxetine  80 mg Oral Daily    levothyroxine  50 mcg Oral Before breakfast    methocarbamoL  500 mg Oral QID    mupirocin   Nasal BID    oxybutynin  10 mg Oral Daily    pantoprazole  40 mg Oral BID AC    polyethylene glycol  17 g Oral Daily    propranoloL  30 mg Oral BID     PRN Meds:  Current Facility-Administered Medications:     acetaminophen, 650 mg, Oral, Q8H PRN    HYDROmorphone, 0.5 mg, Intravenous, Q6H PRN    naloxone, 0.02 mg, Intravenous, PRN    oxyCODONE, 5 mg, Oral, Q4H PRN    oxyCODONE, 10 mg, Oral, Q4H PRN    prochlorperazine, 5 mg, Intravenous, Q6H PRN    senna-docusate 8.6-50 mg, 1 tablet, Oral, Daily PRN     Review of patient's allergies indicates:  No Known Allergies  Objective:     Vital Signs (Most Recent):  Temp: 98.4 °F (36.9 °C) (04/26/24 0310)  Pulse: 85 (04/26/24 0310)  Resp: 18 (04/26/24 0651)  BP: (!) 95/56 (04/26/24 0310)  SpO2: 95 % (04/26/24 0310) Vital Signs (24h Range):  Temp:  [97.4 °F (36.3 °C)-98.4 °F (36.9 °C)] 98.4 °F (36.9 °C)  Pulse:  [] 85  Resp:  [16-18] 18  SpO2:  [94 %-100 %] 95 %  BP: ()/(50-67) 95/56     Weight: 78.9 kg (174 lb)  Body mass index is 31.83 kg/m².    Intake/Output - Last 3 Shifts         04/24 0700 04/25 0659 04/25 0700 04/26 0659 04/26 0700 04/27 0659    P.O. 2160 960     I.V. (mL/kg)  2943.9 (37.3)     IV Piggyback  504.4     Total Intake(mL/kg) 2160 (27.4) 4408.3 (55.9)     Urine (mL/kg/hr) 1250 (0.7) 2350 (1.2)     Stool  0     Blood       Total Output 1250  2350     Net +910 +2058.3            Stool Occurrence 0 x 0 x              Physical Exam  Vitals and nursing note reviewed.   Constitutional:       General: She is not in acute distress.     Appearance: Normal appearance.   Cardiovascular:      Rate and Rhythm: Normal rate and regular rhythm.   Pulmonary:      Effort: Pulmonary effort is normal. No respiratory distress.   Abdominal:      Comments: Abdomen soft, non-distended. Abdomen appropriately tender  Incision CDI with dermabond   Musculoskeletal:         General: Normal range of motion.   Skin:     General: Skin is warm.      Coloration: Skin is not jaundiced.   Neurological:      General: No focal deficit present.      Mental Status: She is alert.          Significant Labs:  I have reviewed all pertinent lab results within the past 24 hours.  CBC:   Recent Labs   Lab 04/26/24  0334   WBC 13.06*   RBC 3.08*   HGB 9.6*   HCT 29.0*      MCV 94   MCH 31.2*   MCHC 33.1     CMP:   Recent Labs   Lab 04/26/24  0334   *   CALCIUM 8.9   ALBUMIN 2.2*   PROT 5.6*   *   K 3.9   CO2 23      BUN 7   CREATININE 0.6   ALKPHOS 240*   ALT 89*   AST 68*   BILITOT 0.5       Significant Diagnostics:  I have reviewed all pertinent imaging results/findings within the past 24 hours.

## 2024-04-26 NOTE — ASSESSMENT & PLAN NOTE
54 y.o. female with hx of Que-en-Y gastric bypass with choledocholithiasis. She is 3 Days Post-Op laparoscopic cholecystectomy, IOC and assisted ERCP with successful clearance of duct    Tolerating regular diet  Pain controlled on multi-modal regimen  OOB/Ambulate  PRN anti emetics  Protonix  Home meds  DVT ppx  Daily labs    Dispo: likely discharge

## 2024-04-26 NOTE — PLAN OF CARE
Patient discharged to home. Discharge instructions verbally given and hard copy provided to patient. Prescription delivered to bedside. Removed 20 g IV with cath tip in place. Patient tolerated IV removal well with bleeding controlled. Patient remains free of falls with no acute pain or distress noted. Patient provided cane prior to discharge. Patient left floor via transport.

## 2024-04-26 NOTE — PLAN OF CARE
[facility]  Discharge Final Note    Primary Care Provider: No, Primary Doctor  Expected Discharge Date: 4/26/2024    Patient medically ready for discharge to home.     Transportation by family.     Is family/patient aware of discharge: yes     Hospital follow up scheduled: yes       Final Discharge Note (most recent)       Final Note - 04/26/24 1340          Final Note    Assessment Type Final Discharge Note     Anticipated Discharge Disposition Home or Self Care     Hospital Resources/Appts/Education Provided Provided patient/caregiver with written discharge plan information                     Important Message from Medicare  Important Message from Medicare regarding Discharge Appeal Rights: Explained to patient/caregiver, Given to patient/caregiver, Signed/date by patient/caregiver   Date IMM was signed: 04/26/24  Time IMM was signed: 0931  Referral Info (most recent)       Referral Info    No documentation.                 Contact Info       No, Primary Doctor   Relationship: PCP - General        Next Steps: Follow up    [address]  [id number]    [phone]       Next Steps: Follow up    Instructions: Please call PCP to make a follow up appointment          Future Appointments   Date Time Provider Department Center   5/9/2024  1:30 PM Madelyn Gutierrez, APRN,ANP-C [facility] Abdirizak Rosas RN  Case Management  Ext: [phone]  04/26/2024

## 2024-04-26 NOTE — DISCHARGE SUMMARY
[facility]  General Surgery  Discharge Summary      Patient Name: Anila Roberson  MRN: [id number]  Admission Date: 4/19/2024  Hospital Length of Stay: 7 days  Discharge Date and Time:  04/26/2024 9:28 AM  Attending Physician: Estuardo Underwood MD   Discharging Provider: Hollie Gates NP  Primary Care Provider: Corie, Primary Doctor    HPI:   Anila Roberson is a 54yoF who is known to the surgical oncology service. She has a history significant for HTN, HLD, and Que-en-Y gastric bypass who was scheduled for outpatient cholecystectomy with joint intra-operative ERCP with Dr. Tovar. She was seen in our clinic yesterday. The patient began to experience, low grade fevers, worsening right upper quadrant abdominal pain, nausea, PO aversion. Given the worsening pain and fevers, she was instructed to present to the emergency department.     After the clinic visit yesterday, her CMP was significant for elevated ALP, AST, and ALT. Her CT scan demonstrates continued common bile duct dilation to 1cm. In the emergency department today, she continues to endorse epigastric abdominal pain and remains tender. She is not jaundiced and does not demonstrate signs of peritonitis.     She has had a previous bypass and hysterectomy. She takes no blood thinning medications.     Procedure(s) (LRB):  CHOLECYSTECTOMY, LAPAROSCOPIC, WITH CHOLANGIOGRAM (N/A)  ERCP (ENDOSCOPIC RETROGRADE CHOLANGIOPANCREATOGRAPHY) (N/A)  GASTROTOMY WITH CLOSURE      Indwelling Lines/Drains at time of discharge:   Lines/Drains/Airways       None                 Hospital Course: Ms. Roberson is a 54 y.o. female with hx of Que-en-Y gastric bypass with choledocholithiasis. She is s/p laparoscopic cholecystectomy, IOC and assisted ERCP with successful clearance of duct on 4/23/2024. The patient is tolerating a regular diet.  She is voiding and ambulating without difficulty.  Patient with no complaints of nausea, vomiting, chest pain or shortness of breath.   Her vital signs stable. She is afebrile. She is positive for flatus and positive for bowel sounds.  CV: RRR  Lungs: CTA bilaterally  Abdomen:  Soft, non-tender, non-distended, positive for bowel sounds, incision clean, dry and intact.    Goals of Care Treatment Preferences:  Code Status: Full Code      Consults:   Consults (From admission, onward)          Status Ordering Provider     Inpatient consult to Midline team  Once        Provider:  (Not yet assigned)    Completed MARIELY NEGRO     Inpatient consult to [facility]  Once        Provider:  (Not yet assigned)    Completed MARIELY DREW     Inpatient consult to General surgery  Once        Provider:  (Not yet assigned)    Completed THERESA DUFFY            Significant Diagnostic Studies: Labs: CMP   Recent Labs   Lab 04/25/24 0412 04/26/24  0334   * 134*   K 3.1* 3.9    103   CO2 21* 23    112*   BUN 11 7   CREATININE 0.7 0.6   CALCIUM 8.9 8.9   PROT 6.2 5.6*   ALBUMIN 2.5* 2.2*   BILITOT 0.5 0.5   ALKPHOS 301* 240*   * 68*   * 89*   ANIONGAP 11 8    and CBC   Recent Labs   Lab 04/25/24 0412 04/26/24  0334   WBC 13.61* 13.06*   HGB 10.5* 9.6*   HCT 33.1* 29.0*    315       Pending Diagnostic Studies:       Procedure Component Value Units Date/Time    Specimen to Pathology, Surgery General Surgery [[id number]] Collected: 04/23/24 1932    Order Status: Sent Lab Status: In process Updated: 04/24/24 0849    Specimen: Tissue           Final Active Diagnoses:    Diagnosis Date Noted POA    PRINCIPAL PROBLEM:  Choledocholithiasis [K80.50] 02/12/2024 Yes    HLD (hyperlipidemia) [E78.5] 04/24/2024 Yes    Obesity (BMI 30-39.9) [E66.9] 04/24/2024 Unknown    HTN (hypertension) [I10] 04/24/2024 Yes    Hypothyroid [E03.9] 04/24/2024 Yes    Hyponatremia [E87.1] 04/24/2024 Yes    Hypophosphatemia [E83.39] 04/24/2024 Unknown    GERD (gastroesophageal reflux disease) [K21.9] 04/24/2024 Yes       Problems Resolved During this Admission:      Discharged Condition: good    Disposition: Home or Self Care    Follow Up:   Follow-up Information       No, Primary Doctor .                           Patient Instructions:      Diet Adult Regular     Lifting restrictions   Order Comments: No lifting > 10 pounds.  May shower.     Notify your health care provider if you experience any of the following:  temperature >100.4     Notify your health care provider if you experience any of the following:  persistent nausea and vomiting or diarrhea     Notify your health care provider if you experience any of the following:  severe uncontrolled pain     Notify your health care provider if you experience any of the following:  redness, tenderness, or signs of infection (pain, swelling, redness, odor or green/yellow discharge around incision site)     Notify your health care provider if you experience any of the following:  difficulty breathing or increased cough     Notify your health care provider if you experience any of the following:  severe persistent headache     Notify your health care provider if you experience any of the following:  worsening rash     Notify your health care provider if you experience any of the following:  persistent dizziness, light-headedness, or visual disturbances     Notify your health care provider if you experience any of the following:  increased confusion or weakness     No dressing needed     Activity as tolerated     Medications:  Reconciled Home Medications:      Medication List        START taking these medications      acetaminophen 325 MG tablet  Commonly known as: TYLENOL  Take 2 tablets (650 mg total) by mouth every 8 (eight) hours as needed for Pain (pain).     oxyCODONE 5 MG immediate release tablet  Commonly known as: ROXICODONE  Take 1 tablet (5 mg total) by mouth every 6 (six) hours as needed for Pain.     pantoprazole 40 MG tablet  Commonly known as: PROTONIX  Take 1 tablet (40  mg total) by mouth 2 (two) times daily before meals.     polyethylene glycol 17 gram Pwpk  Commonly known as: GLYCOLAX  Take 17 g by mouth once daily. for 14 days  Start taking on: April 27, 2024     senna-docusate 8.6-50 mg 8.6-50 mg per tablet  Commonly known as: PERICOLACE  Take 1 tablet by mouth daily as needed for Constipation.            CONTINUE taking these medications      amLODIPine 10 MG tablet  Commonly known as: NORVASC  Take 10 mg by mouth once daily.     atorvastatin 80 MG tablet  Commonly known as: LIPITOR  Take 40 mg by mouth every evening.     BANOPHEN 25 mg capsule  Generic drug: diphenhydrAMINE  Take 25 mg by mouth nightly as needed.     butalbital-acetaminophen-caffeine -40 mg -40 mg per tablet  Commonly known as: FIORICET, ESGIC  Take 1 tablet by mouth 2 (two) times daily as needed for Headaches.     estradioL 1 MG tablet  Commonly known as: ESTRACE  Take 1 tablet by mouth every evening.     FLUoxetine 20 MG capsule  Take 80 mg by mouth once daily.     magnesium oxide 420 mg Tab  Take 420 mg by mouth once daily.     MYRBETRIQ 50 mg Tb24  Generic drug: mirabegron  Take 1 tablet by mouth every morning.     promethazine 25 MG tablet  Commonly known as: PHENERGAN  Take 1 tablet by mouth every 6 (six) hours as needed for Nausea.     propranoloL 10 MG tablet  Commonly known as: INDERAL  Take 30 mg by mouth 2 (two) times daily.     SYNTHROID 50 MCG tablet  Generic drug: levothyroxine  Take 50 mcg by mouth before breakfast.     tiZANidine 2 MG tablet  Commonly known as: ZANAFLEX  Take 2 mg by mouth every 8 (eight) hours as needed.     tolterodine 4 MG 24 hr capsule  Commonly known as: DETROL LA  Take 4 mg by mouth every evening.     topiramate 100 MG tablet  Commonly known as: TOPAMAX  Take 100 mg by mouth 2 (two) times daily.     zinc sulfate 50 mg zinc (220 mg) capsule  Commonly known as: ZINCATE  Take 50 mg by mouth once daily.            STOP taking these medications      famotidine 20  MG tablet  Commonly known as: PEPCID            ASK your doctor about these medications      HYDROcodone-acetaminophen  mg per tablet  Commonly known as: NORCO  Take 1 tablet by mouth every 6 (six) hours as needed for Pain.  Ask about: Should I take this medication?            Time spent on the discharge of patient: 20 minutes    Hollie Gates NP  General Surgery  [facility]

## 2024-04-26 NOTE — PT/OT/SLP PROGRESS
Physical Therapy Treatment    Patient Name:  Anila Roberson   MRN:  [id number]    Recommendations:     Discharge Recommendations: No Therapy Indicated  Discharge Equipment Recommendations: cane, straight  Barriers to discharge: None    Assessment:     Anila Roberson is a 54 y.o. female admitted with a medical diagnosis of Choledocholithiasis.  She presents with the following impairments/functional limitations: weakness, gait instability, impaired endurance, impaired self care skills, impaired functional mobility, pain, impaired cardiopulmonary response to activity, impaired balance . Pt was motivated and cooperative with treatment session. Pt Progressing with PT Intervention. Pt   with improved balance with  gait  with use of SPC. Pt would continue to benefit from skilled PT to address overall functional mobility, goals , and to return to functional baseline.  Goals remain appropriate.        Rehab Prognosis: Good; patient would benefit from acute skilled PT services to address these deficits and reach maximum level of function.    Recent Surgery: Procedure(s) (LRB):  CHOLECYSTECTOMY, LAPAROSCOPIC, WITH CHOLANGIOGRAM (N/A)  ERCP (ENDOSCOPIC RETROGRADE CHOLANGIOPANCREATOGRAPHY) (N/A)  GASTROTOMY WITH CLOSURE 3 Days Post-Op    Plan:     During this hospitalization, patient to be seen 3 x/week to address the identified rehab impairments via gait training, therapeutic activities, therapeutic exercises, neuromuscular re-education and progress toward the following goals:    Plan of Care Expires:  05/24/24    Subjective     Chief Complaint: my R knee bothers me when I walk  Patient/Family Comments/goals: I feel better with using the cane  Pain/Comfort:  Pain Rating 1: 9/10  Location 1: abdomen  Pain Addressed 1: Reposition, Distraction, Pre-medicate for activity  Pain Rating Post-Intervention 1: 9/10      Objective:     Communicated with RN prior to session.  Patient found  seated  with   upon PT entry to room.     General  Precautions: Standard, fall  Orthopedic Precautions: N/A  Braces: N/A  Respiratory Status: Room air     Functional Mobility:  Transfers:     Sit to Stand:  stand by assistance with no AD  Gait: pt amb with no AD x 200 ft with SBA/CGA and reaching for wall rail support at times. Pt then ambulated with SPC x 200 ft with SBA with improved balance and stability noted  Stairs:  Pt ascended/descended 1 flight(s) with No Assistive Device with right handrail with Contact Guard Assistance.       AM-PAC 6 CLICK MOBILITY  Turning over in bed (including adjusting bedclothes, sheets and blankets)?: 4  Sitting down on and standing up from a chair with arms (e.g., wheelchair, bedside commode, etc.): 4  Moving from lying on back to sitting on the side of the bed?: 4  Moving to and from a bed to a chair (including a wheelchair)?: 4  Need to walk in hospital room?: 4  Climbing 3-5 steps with a railing?: 3  Basic Mobility Total Score: 23       Treatment & Education:  Therapist provided instruction and educated of  patient on progress, safety,d/c,PT POC,   proper body mechanics, energy conservation, and fall prevention strategies during tasks listed above, on the effects of prolonged immobility and the importance of performing OOB activity and exercises to promote healing and reduce recovery time     Updated white board with appropriate PT mobility information for medical team notification   Donned an extra gown   Pt encouraged to ambulate in hallways 3x/day with nursing or family assistance to improve endurance.   Pt safe to ambulate in hallway with RN or PCT assistance  Call nursing/pct to transfer to chair/use bathroom. Pt stated understanding  Bedside table in front of patient and area set up for function, convenience, and safety. RN aware of patient's mobility needs and status. Questions/concerns addressed within PTA scope of practice; patient  with no further questions. Time was provided for active listening, discussion of health  disposition, and discussion of safe discharge. Pt?verbalized?agreement .    Patient left up in chair with all lines intact, call button in reach, and nsg notified..    GOALS:   Multidisciplinary Problems       Physical Therapy Goals          Problem: Physical Therapy    Goal Priority Disciplines Outcome Goal Variances Interventions   Physical Therapy Goal     PT, PT/OT Progressing     Description: Goals to be met by: 5/3     Patient will increase functional independence with mobility by performin. Supine to sit with Modified Ritchie  2. Sit to supine with Modified Ritchie  3. Sit to stand transfer with Modified Ritchie  4. Gait  x 400 feet with Modified Ritchie using No Assistive Device.   5. Ascend/descend 1 flight stair with left Handrails stand by assistance  using No Assistive Device.                          Time Tracking:     PT Received On: 24  PT Start Time: 817     PT Stop Time: 828  PT Total Time (min): 11 min     Billable Minutes: Gait Training 11    Treatment Type: Treatment  PT/PTA: PTA     Number of PTA visits since last PT visit: 2024

## 2024-05-03 LAB
FINAL PATHOLOGIC DIAGNOSIS: NORMAL
FROZEN SECTION DIAGNOSIS: NORMAL
FROZEN SECTION FOOTNOTE: NORMAL
GROSS: NORMAL
Lab: NORMAL

## 2024-05-08 NOTE — PROGRESS NOTES
"Post-Op Follow-up Visit:   5/9/2024  Patient ID: Anila Roberson is a 54 y.o. female, born 1970    Chief Complaint   Patient presents with    Post-op Evaluation     2002: s/p gastric bypass  2/2024: presented to [place] ED with epigastric pain, imaging - distended gallbladder with stones in CBD.   3/19/2024: outside MRI  4/23/2024: s/p lap cholecystectomy with cholangiogram, assisted ERCP and gastrotomy per Dr. Underwood    Interval History: This 55 y/o female returns to clinic alone from [place], [place]. She was admitted 4/19/2024 and underwent surgery on 4/23/24. She was discharged to home on POD 3 on 4/26/24 on a regular diet. C/o incisional pain. Appetite is improving since home, denies N/V. Weight is down 8# since surgery.     Lab Results   Component Value Date    WBC 13.06 (H) 04/26/2024    HGB 9.6 (L) 04/26/2024    HCT 29.0 (L) 04/26/2024    MCV 94 04/26/2024     04/26/2024         Chemistry        Component Value Date/Time     (L) 04/26/2024 0334    K 3.9 04/26/2024 0334     04/26/2024 0334    CO2 23 04/26/2024 0334    BUN 7 04/26/2024 0334    CREATININE 0.6 04/26/2024 0334     (H) 04/26/2024 0334        Component Value Date/Time    CALCIUM 8.9 04/26/2024 0334    ALKPHOS 240 (H) 04/26/2024 0334    AST 68 (H) 04/26/2024 0334    ALT 89 (H) 04/26/2024 0334    BILITOT 0.5 04/26/2024 0334        Lab Results   Component Value Date    ALBUMIN 2.2 (L) 04/26/2024     Physical Exam:  /73   Pulse 79   Ht 5' 2" (1.575 m)   Wt 75.7 kg (166 lb 14.2 oz)   SpO2 97%   BMI 30.52 kg/m²     General:  Non-toxic, ambulatory  Abd:  Soft, non-tender  Incision:  healing well without erythema or exudate.    Pathology:   Diagnosis 1. Hepatic nodule (biopsy):    - Features of duct obstruction with prominent duct reaction, inflammation, portal expansion  - Ductular cholestasis  - Mixed steatosis, 10%      2. Hepatic mass (biopsy):  - Marked ductular reaction with ductular cholestatsis, inflammation and " reactive changes  - Mixed steatosis, 30%    3. Gallbladder (cholecystectomy):  - Chronic cholecystitis with patchy necrosis    4. Additional liver nodule (excision):  - Cauterized liver parenchyma with bile duct hamartomas, prominent duct reaction, duct inflammation, periductal inflammation, reactive changes  - Capsular fibrosis and thickening  - No definitive neoplasm identified         ICD-10-CM ICD-9-CM    1. Choledocholithiasis  K80.50 574.50 CT Abdomen Pelvis With IV Contrast Routine Oral Contrast      Comprehensive Metabolic Panel      CT Abdomen Pelvis With IV Contrast Routine Oral Contrast      Comprehensive Metabolic Panel      2. H/O gastric bypass in 2002  Z98.84 V45.86       Plan     Reviewed pathology with her.    RTC in 3 months with TPCT A/P and CMP lab. She may have VV for f/u to discuss results.     Questions were asked and answered to patient's satisfaction.      Pt seen by Dr. Underwood today.         Madelyn Gutierrez NP  Upper GI / Hepatobiliary Surgical Oncology  [facility] [place], [place]  Office: [phone]  Fax: [phone]

## 2024-05-09 ENCOUNTER — OFFICE VISIT (OUTPATIENT)
Dept: SURGERY | Facility: CLINIC | Age: 54
End: 2024-05-09
Payer: MEDICARE

## 2024-05-09 VITALS
OXYGEN SATURATION: 97 % | HEIGHT: 62 IN | SYSTOLIC BLOOD PRESSURE: 122 MMHG | WEIGHT: 166.88 LBS | BODY MASS INDEX: 30.71 KG/M2 | HEART RATE: 79 BPM | DIASTOLIC BLOOD PRESSURE: 73 MMHG

## 2024-05-09 DIAGNOSIS — K80.50 CHOLEDOCHOLITHIASIS: Primary | ICD-10-CM

## 2024-05-09 DIAGNOSIS — Z98.84 H/O GASTRIC BYPASS: ICD-10-CM

## 2024-05-09 PROCEDURE — 99999 PR PBB SHADOW E&M-EST. PATIENT-LVL V: CPT | Mod: PBBFAC,,, | Performed by: NURSE PRACTITIONER

## 2024-05-09 PROCEDURE — 99024 POSTOP FOLLOW-UP VISIT: CPT | Mod: POP,,, | Performed by: NURSE PRACTITIONER

## 2024-05-09 PROCEDURE — 99215 OFFICE O/P EST HI 40 MIN: CPT | Mod: PBBFAC | Performed by: NURSE PRACTITIONER

## 2024-05-09 RX ORDER — LANOLIN ALCOHOL/MO/W.PET/CERES
100 CREAM (GRAM) TOPICAL DAILY
COMMUNITY

## 2024-05-09 RX ORDER — HYDROCODONE BITARTRATE AND ACETAMINOPHEN 10; 325 MG/1; MG/1
TABLET ORAL
COMMUNITY
Start: 2024-05-01

## 2024-05-09 RX ORDER — IBUPROFEN 100 MG/5ML
1000 SUSPENSION, ORAL (FINAL DOSE FORM) ORAL DAILY
COMMUNITY

## 2024-05-13 NOTE — PROGRESS NOTES
Seen with Madelyn. Still having some pain that seems mostly incisional. Eating better without nausea or vomiting. Bowel movements have alternated between diarrhea and thick green consistency. Incisions healing well. Abdomen soft, non-tender. Path showed chronic cholecystitis with patchy necrosis. Path of the liver lesion biopsies showed prominent duct reaction with cholestasis and inflammation. I think the liver lesions were probably related to the biliary obstruction, but I would like to get a CT and labs in 3 months to make sure we are not missing anything else in the liver. That visit can be virtual.     Estuardo Underwood MD  Surgical Oncology

## 2024-05-27 ENCOUNTER — TELEPHONE (OUTPATIENT)
Dept: SURGERY | Facility: CLINIC | Age: 54
End: 2024-05-27
Payer: MEDICARE

## 2024-05-27 NOTE — TELEPHONE ENCOUNTER
Attempted to reach CT dept at [facility] in [place] to request imaging taken on 5/25/24. No answer and unable to leave a message.  stated that it is a holiday and she does not think staff is in. Will attempt again at a later time today/tomorrow.

## 2024-05-27 NOTE — TELEPHONE ENCOUNTER
Spoke to Ms. Roberson this morning regarding her recent ED visit over the weekend. Pt experienced an opening to her surgical incision with drainage. She had a CT at [facility] in [place].   She currently is feeling fine, denies pain, erythema, edema to incision site. She reports sero sanguinous drainage from opening to incision sire, she has been keeping the area clean with mild soap and water and covered with dry gauze. Denies fever, chill or body aches. Offered pt a follow up appt this Thursday but she declined. She is aware of sign/symptoms of infection and understands when to call to report abnormal symptoms. Will request outside imaging for Dr. Underwood to review.

## 2024-08-08 ENCOUNTER — OFFICE VISIT (OUTPATIENT)
Dept: SURGERY | Facility: CLINIC | Age: 54
End: 2024-08-08
Payer: MEDICARE

## 2024-08-08 DIAGNOSIS — Z90.49 S/P CHOLECYSTECTOMY: Primary | ICD-10-CM

## 2024-08-08 PROCEDURE — 99213 OFFICE O/P EST LOW 20 MIN: CPT | Mod: 95,,, | Performed by: NURSE PRACTITIONER

## 2024-08-08 NOTE — PROGRESS NOTES
The patient location is: home  The chief complaint leading to consultation is: imaging and lab review    Visit type: audiovisual    Face to Face time with patient: 22 minutes  29 minutes of total time spent on the encounter, which includes face to face time and non-face to face time preparing to see the patient (eg, review of tests), Obtaining and/or reviewing separately obtained history, Documenting clinical information in the electronic or other health record, Independently interpreting results (not separately reported) and communicating results to the patient/family/caregiver, or Care coordination (not separately reported).     Each patient to whom he or she provides medical services by telemedicine is:  (1) informed of the relationship between the physician and patient and the respective role of any other health care provider with respect to management of the patient; and (2) notified that he or she may decline to receive medical services by telemedicine and may withdraw from such care at any time.          Encounter Date:  2024    Patient ID: Anila Roberson  Age:  54 y.o. :  1970    Chief Complaint:  followup of post cholecystectomy      2002: s/p gastric bypass  2024: presented to [place] ED with epigastric pain, imaging - distended gallbladder with stones in CBD.   3/19/2024: outside MRI  2024: s/p lap cholecystectomy with cholangiogram, assisted ERCP and gastrotomy per Dr. Underwood; Path = chronic cholecystitis with patchy necrosis. Path of the liver lesion biopsies =prominent duct reaction with cholestasis and inflammation     Interval History:  Ms. Roberson remains at home in [place], [place]. She was last seen in clinic in 2024.   Reviewed outside records and labs.   She recently had short interval imaging and labs locally at [facility] earlier this week. Images from CT scan not available today but radiology report is.   C/o tenderness to abd incision. Otherwise she is feeling well.  Reports good appetite, tolerating oral diet without N/V. Weight is stable. She did have an episode of diarrhea recently when traveling out of town with her . Denies CP, SOB. Remains active with household chores and she is the primary caregiver for her .     New Data:  Imagin2024: CT A/P:            2024: Labs:        2024: Pathology:    1. Hepatic nodule (biopsy):    - Features of duct obstruction with prominent duct reaction, inflammation, portal expansion   - Ductular cholestasis   - Mixed steatosis, 10%      2. Hepatic mass (biopsy):   - Marked ductular reaction with ductular cholestatsis, inflammation and reactive changes   - Mixed steatosis, 30%     3. Gallbladder (cholecystectomy):   - Chronic cholecystitis with patchy necrosis     4. Additional liver nodule (excision):   - Cauterized liver parenchyma with bile duct hamartomas, prominent duct reaction, duct inflammation, periductal inflammation, reactive changes   - Capsular fibrosis and thickening   - No definitive neoplasm identified     Past Medical History:   Diagnosis Date    Arthritis     Atrial fibrillation     GERD (gastroesophageal reflux disease)     Hyperlipidemia     Hypertension     Hypothyroidism     Migraine headache     Sleep apnea, unspecified      Past Surgical History:   Procedure Laterality Date    CARDIAC ELECTROPHYSIOLOGY STUDY AND ABLATION      CERVICAL FUSION      cage C4-C7    ERCP N/A 2024    Procedure: ERCP (ENDOSCOPIC RETROGRADE CHOLANGIOPANCREATOGRAPHY);  Surgeon: Estuardo Underwood MD;  Location: [facility] OR [address] [place] [address];  Service: General;  Laterality: N/A;    GASTRIC BYPASS  2002    GASTROTOMY  2024    Procedure: GASTROTOMY WITH CLOSURE;  Surgeon: Estuardo Underwood MD;  Location: [facility] OR [address] [place] [address];  Service: General;;    LAPAROSCOPIC CHOLECYSTECTOMY WITH CHOLANGIOGRAPHY N/A 2024    Procedure: CHOLECYSTECTOMY, LAPAROSCOPIC, WITH CHOLANGIOGRAM;  Surgeon: Estuardo Underwood MD;  Location: [facility]  OR 2ND FLR;  Service: General;  Laterality: N/A;    SPINE SURGERY      L5-S1     Current Outpatient Medications on File Prior to Visit   Medication Sig Dispense Refill    acetaminophen (TYLENOL) 325 MG tablet Take 2 tablets (650 mg total) by mouth every 8 (eight) hours as needed for Pain (pain). 30 tablet 0    amLODIPine (NORVASC) 10 MG tablet Take 10 mg by mouth once daily.      ascorbic acid, vitamin C, (VITAMIN C) 1000 MG tablet Take 1,000 mg by mouth once daily. 50516 units per day      atorvastatin (LIPITOR) 80 MG tablet Take 40 mg by mouth every evening.      BANOPHEN 25 mg capsule Take 25 mg by mouth nightly as needed.      butalbital-acetaminophen-caffeine -40 mg (FIORICET, ESGIC) -40 mg per tablet Take 1 tablet by mouth 2 (two) times daily as needed for Headaches.      cyanocobalamin (VITAMIN B-12) 1000 MCG tablet Take 100 mcg by mouth once daily. Every other week      estradioL (ESTRACE) 1 MG tablet Take 1 tablet by mouth every evening.      FLUoxetine 20 MG capsule Take 80 mg by mouth once daily.      HYDROcodone-acetaminophen (NORCO)  mg per tablet TAKE 1 TABLET BY MOUTH EVERY 6 HOURS AS NEEDED FOR PAIN (5/01/24 TO 5/30/24 SUPPLY)      levothyroxine (SYNTHROID) 50 MCG tablet Take 50 mcg by mouth before breakfast.      magnesium oxide 420 mg Tab Take 420 mg by mouth once daily.      MYRBETRIQ 50 mg Tb24 Take 1 tablet by mouth every morning.      oxyCODONE (ROXICODONE) 5 MG immediate release tablet Take 1 tablet (5 mg total) by mouth every 6 (six) hours as needed for Pain. (Patient not taking: Reported on 5/9/2024) 24 tablet 0    pantoprazole (PROTONIX) 40 MG tablet Take 1 tablet (40 mg total) by mouth 2 (two) times daily before meals. 60 tablet 3    promethazine (PHENERGAN) 25 MG tablet Take 1 tablet by mouth every 6 (six) hours as needed for Nausea.      propranoloL (INDERAL) 10 MG tablet Take 30 mg by mouth 2 (two) times daily.      senna-docusate 8.6-50 mg (PERICOLACE) 8.6-50 mg per  tablet Take 1 tablet by mouth daily as needed for Constipation. (Patient not taking: Reported on 5/9/2024) 30 tablet 0    tiZANidine (ZANAFLEX) 2 MG tablet Take 2 mg by mouth every 8 (eight) hours as needed.      tolterodine (DETROL LA) 4 MG 24 hr capsule Take 4 mg by mouth every evening.      topiramate (TOPAMAX) 100 MG tablet Take 100 mg by mouth 2 (two) times daily.      zinc sulfate (ZINCATE) 50 mg zinc (220 mg) capsule Take 50 mg by mouth once daily.       No current facility-administered medications on file prior to visit.     Review of patient's allergies indicates:  No Known Allergies    Family History:  Her family history includes Alcohol abuse in her mother; Heart disease in her father and mother.     Social History:   reports that she has quit smoking. Her smoking use included cigarettes. She has never used smokeless tobacco. She reports current alcohol use of about 1.0 standard drink of alcohol per week. She reports current drug use. Drug: Marijuana.     ROS:    Pertinent positive/negatives detailed in HPI, all other systems negative.     Physical Exam: virtual visit only   Constitutional:  Non-toxic, no acute distress.  Eyes:  Sclerae anicteric, gaze symmetrical  Neck:  Trachea midline,  FROM  Resp:  Easy work of breathing  Neuro:  No gross facial deficits  Psych:  Awake, alert, oriented.  Answers and asks questions appropriately      ICD-10-CM ICD-9-CM    1. S/P cholecystectomy  Z90.49 V45.79       Plan   55 yo female with history of rich en y gastric bypass presented earlier this year with symptoms of cholangitis. She underwent surgery per Dr. Underwood in 4/2024. Path showed chronic cholecystitis with patchy necrosis. Path of the liver lesion biopsies showed prominent duct reaction with cholestasis and inflammation. Her only complaint is mild tenderness to scar. Radiology report read stable, common bile duct dilation, no new or acute findings. Will obtain images for personal review. Labs are acceptable.  Instructed to f/u with local providers as recommended for other health issues.   Questions were asked and answered to patient's satisfaction.      RTC prn     Discussed case with Dr. Underwood who agrees with the above plan of care.           Madelyn Gutierrez NP  Upper GI / Hepatobiliary Surgical Oncology  [facility] [place], [place]  Office: [phone]  Fax: [phone]

## 2024-08-09 PROBLEM — K80.50 CHOLEDOCHOLITHIASIS: Status: RESOLVED | Noted: 2024-02-12 | Resolved: 2024-08-09

## 2024-08-30 ENCOUNTER — PATIENT MESSAGE (OUTPATIENT)
Dept: SURGERY | Facility: CLINIC | Age: 54
End: 2024-08-30
Payer: MEDICARE

## 2024-10-07 ENCOUNTER — PATIENT MESSAGE (OUTPATIENT)
Dept: SURGERY | Facility: CLINIC | Age: 54
End: 2024-10-07
Payer: MEDICARE

## 2024-10-07 ENCOUNTER — TELEPHONE (OUTPATIENT)
Dept: SURGERY | Facility: CLINIC | Age: 54
End: 2024-10-07
Payer: MEDICARE

## 2024-10-07 NOTE — TELEPHONE ENCOUNTER
"Pt called to report leaking from her abdominal incisions. She stated she had surgery in April and shortly after experienced complications with incisions opening and leaking fluid.   She is reporting that over the last several days two areas are leaking once again. There is an area on top of the surgical scar that was initially a "pinpoint" and has opened to approximately 1/4" and draining clear, odorless fluid.  The other are is is located in umbilicus and is approximately 1/4" and draining clear, odorless fluid. Pt has kept covered with dry bandage. She denies fever, chills or body aches. Pt states there is some swelling, induration and area are sensitive to touch. She is also experiencing bouts of nausea.   Pt requesting clinic appt either tmrw or Friday. She is scheduled tmrw with CHERRY Wise. Will notify Dr. Underwood and Madelyn.   "

## 2024-10-08 ENCOUNTER — OFFICE VISIT (OUTPATIENT)
Dept: SURGERY | Facility: CLINIC | Age: 54
End: 2024-10-08
Payer: MEDICARE

## 2024-10-08 VITALS
OXYGEN SATURATION: 98 % | SYSTOLIC BLOOD PRESSURE: 154 MMHG | HEART RATE: 76 BPM | WEIGHT: 172.63 LBS | BODY MASS INDEX: 31.57 KG/M2 | DIASTOLIC BLOOD PRESSURE: 81 MMHG

## 2024-10-08 DIAGNOSIS — Z90.49 S/P CHOLECYSTECTOMY: Primary | ICD-10-CM

## 2024-10-08 PROCEDURE — 99214 OFFICE O/P EST MOD 30 MIN: CPT | Mod: PBBFAC | Performed by: NURSE PRACTITIONER

## 2024-10-08 PROCEDURE — 99999 PR PBB SHADOW E&M-EST. PATIENT-LVL IV: CPT | Mod: PBBFAC,,, | Performed by: NURSE PRACTITIONER

## 2024-10-08 PROCEDURE — 99212 OFFICE O/P EST SF 10 MIN: CPT | Mod: S$PBB,,, | Performed by: NURSE PRACTITIONER

## 2024-10-08 RX ORDER — DOXYCYCLINE 100 MG/1
100 CAPSULE ORAL DAILY
COMMUNITY
Start: 2024-05-25

## 2024-10-08 NOTE — PROGRESS NOTES
"    Encounter Date:  10/8/2024    Patient ID: Anila Roberson  Age:  54 y.o. :  1970    Chief Complaint:  midline abd scar "leaking"     2002: s/p gastric bypass  2024: presented to [place] ED with epigastric pain, imaging - distended gallbladder with stones in CBD.   3/19/2024: outside MRI  2024: s/p lap cholecystectomy with cholangiogram, assisted ERCP and gastrotomy per Dr. Underwood; Path = chronic cholecystitis with patchy necrosis. Path of the liver lesion biopsies =prominent duct reaction with cholestasis and inflammation     Interval History:  Ms. Roberson returns to clinic from [place], [place]. She was last seen on VV in 2024, following local ED visit with CT scan. She reports incision healed since then, but re-opened last week with serous output. C/o tenderness to upper aspect of midline abd scar. She also noticed scant output from umbilical trocar site. Recently she admits to decreased appetite with intermittent mild nausea. Denies vomit, diarrhea. Weight stable, similar to pre-op admission weight. Remains afebrile. Denies CP, SOB.   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     New Data:  Imaging:  I personally reviewed the following images:   2024: CT a/p:  Dilated common duct to the level of the head of the pancreas measuring up to a centimeter in size. No obvious pancreatic mass is seen. The patient is status post cholecystectomy, there is no intrahepatic duct dilatation. If clinically indicated endoscopy may be considered to exclude an ampullary mass.     Postoperative changes without additional acute abnormality identified.     2024: Labs:          2024: Pathology:    1. Hepatic nodule (biopsy):    - Features of duct obstruction with prominent duct reaction, inflammation, portal expansion   - Ductular cholestasis   - Mixed steatosis, 10%      2. Hepatic mass (biopsy):   - Marked ductular reaction with ductular cholestatsis, " inflammation and reactive changes   - Mixed steatosis, 30%     3. Gallbladder (cholecystectomy):   - Chronic cholecystitis with patchy necrosis     4. Additional liver nodule (excision):   - Cauterized liver parenchyma with bile duct hamartomas, prominent duct reaction, duct inflammation, periductal inflammation, reactive changes   - Capsular fibrosis and thickening   - No definitive neoplasm identified     Past Medical History:   Diagnosis Date    Arthritis     Atrial fibrillation     GERD (gastroesophageal reflux disease)     Hyperlipidemia     Hypertension     Hypothyroidism     Migraine headache     Sleep apnea, unspecified      Past Surgical History:   Procedure Laterality Date    CARDIAC ELECTROPHYSIOLOGY STUDY AND ABLATION  2022    CERVICAL FUSION      cage C4-C7    ERCP N/A 4/23/2024    Procedure: ERCP (ENDOSCOPIC RETROGRADE CHOLANGIOPANCREATOGRAPHY);  Surgeon: Estuardo Underwood MD;  [facility];  Service: General;  Laterality: N/A;    GASTRIC BYPASS  2002    GASTROTOMY  4/23/2024    Procedure: GASTROTOMY WITH CLOSURE;  Surgeon: Estuardo Underwood MD;  [facility];  Service: General;;    LAPAROSCOPIC CHOLECYSTECTOMY WITH CHOLANGIOGRAPHY N/A 4/23/2024    Procedure: CHOLECYSTECTOMY, LAPAROSCOPIC, WITH CHOLANGIOGRAM;  Surgeon: Estuardo Underwood MD;  [facility];  Service: General;  Laterality: N/A;    SPINE SURGERY      L5-S1     Current Outpatient Medications on File Prior to Visit   Medication Sig Dispense Refill    acetaminophen (TYLENOL) 325 MG tablet Take 2 tablets (650 mg total) by mouth every 8 (eight) hours as needed for Pain (pain). 30 tablet 0    amLODIPine (NORVASC) 10 MG tablet Take 10 mg by mouth once daily.      ascorbic acid, vitamin C, (VITAMIN C) 1000 MG tablet Take 1,000 mg by mouth once daily. 35730 units per day      atorvastatin (LIPITOR) 80 MG tablet Take 40 mg by mouth every evening.      BANOPHEN 25 mg capsule Take 25 mg by mouth nightly as  needed.      butalbital-acetaminophen-caffeine -40 mg (FIORICET, ESGIC) -40 mg per tablet Take 1 tablet by mouth 2 (two) times daily as needed for Headaches.      cyanocobalamin (VITAMIN B-12) 1000 MCG tablet Take 100 mcg by mouth once daily. Every other week      doxycycline (VIBRAMYCIN) 100 MG Cap Take 100 mg by mouth once daily.      estradioL (ESTRACE) 1 MG tablet Take 1 tablet by mouth every evening.      FLUoxetine 20 MG capsule Take 80 mg by mouth once daily.      HYDROcodone-acetaminophen (NORCO)  mg per tablet TAKE 1 TABLET BY MOUTH EVERY 6 HOURS AS NEEDED FOR PAIN (5/01/24 TO 5/30/24 SUPPLY)      levothyroxine (SYNTHROID) 50 MCG tablet Take 50 mcg by mouth before breakfast.      magnesium oxide 420 mg Tab Take 420 mg by mouth once daily.      pantoprazole (PROTONIX) 40 MG tablet Take 1 tablet (40 mg total) by mouth 2 (two) times daily before meals. 60 tablet 3    propranoloL (INDERAL) 10 MG tablet Take 30 mg by mouth 2 (two) times daily.      tiZANidine (ZANAFLEX) 2 MG tablet Take 2 mg by mouth every 8 (eight) hours as needed.      tolterodine (DETROL LA) 4 MG 24 hr capsule Take 4 mg by mouth every evening.      topiramate (TOPAMAX) 100 MG tablet Take 100 mg by mouth 2 (two) times daily.      zinc sulfate (ZINCATE) 50 mg zinc (220 mg) capsule Take 50 mg by mouth once daily.      MYRBETRIQ 50 mg Tb24 Take 1 tablet by mouth every morning.      oxyCODONE (ROXICODONE) 5 MG immediate release tablet Take 1 tablet (5 mg total) by mouth every 6 (six) hours as needed for Pain. (Patient not taking: Reported on 10/8/2024) 24 tablet 0    senna-docusate 8.6-50 mg (PERICOLACE) 8.6-50 mg per tablet Take 1 tablet by mouth daily as needed for Constipation. (Patient not taking: Reported on 10/8/2024) 30 tablet 0     No current facility-administered medications on file prior to visit.     Review of patient's allergies indicates:  No Known Allergies    Family History:  Her family history includes Alcohol  abuse in her mother; Heart disease in her father and mother.     Social History:   reports that she has quit smoking. Her smoking use included cigarettes. She has never used smokeless tobacco. She reports current alcohol use of about 1.0 standard drink of alcohol per week. She reports current drug use. Drug: Marijuana.     ROS:    Pertinent positive/negatives detailed in HPI, all other systems negative.     Physical Exam:  BP (!) 154/81 (BP Location: Left arm, Patient Position: Sitting)   Pulse 76   Wt 78.3 kg (172 lb 9.9 oz)   SpO2 98%   BMI 31.57 kg/m²     Midline abd scar:      Constitutional:  Non-toxic, no acute distress.    Eyes:  Sclerae anicteric, gaze symmetrical  Neck:  Trachea midline,  FROM  Resp:  Easy work of breathing  Abd:  Soft, non-tender, no masses, no ascites  Musculoskeletal:  Ambulatory, normal gait, no muscle wasting.  Extremities are symmetrical without lymphedema.  Neuro:  No gross deficits  Psych:  Awake, alert, oriented.  Answers and asks questions appropriately      ICD-10-CM ICD-9-CM    1. S/P cholecystectomy  Z90.49 V45.79       Plan     53 yo female with history of rich en y gastric bypass presented earlier this year with symptoms of cholangitis. She underwent surgery per Dr. Underwood in 4/2024. Path showed chronic cholecystitis with patchy necrosis. Path of the liver lesion biopsies showed prominent duct reaction with cholestasis and inflammation. Re-reviewed prior outside imaging and labs from 8/2024.   Unable to express any drainage from midline abd scar. Reviewed s/s of surgical site infection including redness, purulent drainage, fever. No need for oral abx at this time. Applied silver nitrate to upper aspect of midline abd scar.   Instructed to f/u with local providers as recommended for other health issues.     RTC prn     Questions were asked and answered to patient's satisfaction.            Madelyn Gutierrez NP  Upper GI / Hepatobiliary Surgical Oncology  [facility]  [place], [place]  Office: [phone]  Fax: [phone]

## (undated) DEVICE — TROCAR ENDOPATH XCEL 5X100MM

## (undated) DEVICE — SUT GUT PL. 4-0 27 FS-2

## (undated) DEVICE — Device

## (undated) DEVICE — ADHESIVE DERMABOND ADVANCED

## (undated) DEVICE — CANNULA ENDOPATH XCEL 5X100MM

## (undated) DEVICE — BLADE SURG CARBON STEEL SZ11

## (undated) DEVICE — DRAPE CORETEMP FLD WRM 56X62IN

## (undated) DEVICE — GOWN SURGICAL X-LARGE

## (undated) DEVICE — TROCAR ENDOPATH XCEL 15MM 10CM

## (undated) DEVICE — SUT 0 VICRYL / UR6 (J603)

## (undated) DEVICE — TRAY MINOR GEN SURG OMC

## (undated) DEVICE — SUT 2/0 30IN SILK BLK BRAI

## (undated) DEVICE — SET IV ADMIN 15DROP 3 CARESITE

## (undated) DEVICE — DRAPE HALF SURGICAL 40X58IN

## (undated) DEVICE — TROCAR ENDOPATH XCEL 12MM 10CM

## (undated) DEVICE — APPLICATOR CHLORAPREP ORN 26ML

## (undated) DEVICE — NDL HYPO REG 25G X 1 1/2

## (undated) DEVICE — ELECTRODE REM PLYHSV RETURN 9

## (undated) DEVICE — SOL NS 1000CC

## (undated) DEVICE — BAG TISS RETRV MONARCH 10MM

## (undated) DEVICE — IRRIGATOR ENDOSCOPY DISP.

## (undated) DEVICE — TROCAR ENDOPATH XCEL 5MM 7.5CM

## (undated) DEVICE — TOWEL OR DISP STRL BLUE 4/PK

## (undated) DEVICE — GOWN POLY REINF BRTH SLV XL

## (undated) DEVICE — TRAY CATH 1-LYR URIMTR 16FR

## (undated) DEVICE — STOPCOCK DISCOFIX 3 WAY

## (undated) DEVICE — SUT SILK 0 SH 30IN BLK BR

## (undated) DEVICE — HEMOSTAT SURGICEL NUKNIT 3X4IN

## (undated) DEVICE — SCISSOR 5MMX35CM DIRECT DRIVE